# Patient Record
Sex: MALE | Race: WHITE | Employment: OTHER | URBAN - METROPOLITAN AREA
[De-identification: names, ages, dates, MRNs, and addresses within clinical notes are randomized per-mention and may not be internally consistent; named-entity substitution may affect disease eponyms.]

---

## 2023-03-06 PROBLEM — N63.0 BREAST LUMP: Status: ACTIVE | Noted: 2023-03-06

## 2023-03-06 PROBLEM — R53.83 FATIGUE: Status: ACTIVE | Noted: 2023-03-06

## 2023-03-06 PROBLEM — K64.4 EXTERNAL HEMORRHOID: Status: ACTIVE | Noted: 2023-03-06

## 2023-03-06 PROBLEM — M25.561 RIGHT KNEE PAIN: Status: ACTIVE | Noted: 2023-03-06

## 2023-03-06 PROBLEM — K92.1 BLOOD IN STOOL: Status: ACTIVE | Noted: 2023-03-06

## 2023-03-06 PROBLEM — M06.00 SERONEGATIVE RHEUMATOID ARTHRITIS (MULTI): Status: ACTIVE | Noted: 2023-03-06

## 2023-03-06 PROBLEM — R19.7 DIARRHEA: Status: ACTIVE | Noted: 2023-03-06

## 2023-03-06 PROBLEM — R07.9 CHEST PAIN: Status: ACTIVE | Noted: 2023-03-06

## 2023-03-06 PROBLEM — R35.0 URINARY FREQUENCY: Status: ACTIVE | Noted: 2023-03-06

## 2023-03-06 PROBLEM — E53.8 VITAMIN B12 DEFICIENCY: Status: ACTIVE | Noted: 2023-03-06

## 2023-03-06 PROBLEM — R79.89 ELEVATED TSH: Status: ACTIVE | Noted: 2023-03-06

## 2023-03-06 PROBLEM — H91.90 HEARING LOSS: Status: ACTIVE | Noted: 2023-03-06

## 2023-03-06 PROBLEM — I10 HYPERTENSION: Status: ACTIVE | Noted: 2023-03-06

## 2023-03-06 PROBLEM — F41.9 ANXIETY: Status: ACTIVE | Noted: 2023-03-06

## 2023-03-06 PROBLEM — H61.22 LEFT EAR IMPACTED CERUMEN: Status: ACTIVE | Noted: 2023-03-06

## 2023-03-06 PROBLEM — K90.89 BILE SALT-INDUCED DIARRHEA (HHS-HCC): Status: ACTIVE | Noted: 2023-03-06

## 2023-03-06 PROBLEM — T16.1XXA EAR FOREIGN BODY, RIGHT, INITIAL ENCOUNTER: Status: ACTIVE | Noted: 2023-03-06

## 2023-03-06 PROBLEM — M54.16 LUMBAR RADICULOPATHY: Status: ACTIVE | Noted: 2023-03-06

## 2023-03-06 PROBLEM — N62 GYNECOMASTIA: Status: ACTIVE | Noted: 2023-03-06

## 2023-03-06 PROBLEM — R20.0 FACIAL NUMBNESS: Status: ACTIVE | Noted: 2023-03-06

## 2023-03-06 PROBLEM — S40.029A HEMATOMA OF ARM: Status: ACTIVE | Noted: 2023-03-06

## 2023-03-06 PROBLEM — M54.50 LOW BACK PAIN: Status: ACTIVE | Noted: 2023-03-06

## 2023-03-06 PROBLEM — C61 PROSTATE CANCER (MULTI): Status: ACTIVE | Noted: 2023-03-06

## 2023-03-06 PROBLEM — H61.23 BILATERAL IMPACTED CERUMEN: Status: ACTIVE | Noted: 2023-03-06

## 2023-03-06 RX ORDER — METOPROLOL SUCCINATE 100 MG/1
1 TABLET, EXTENDED RELEASE ORAL DAILY
COMMUNITY
End: 2023-12-21 | Stop reason: SDUPTHER

## 2023-03-06 RX ORDER — EZETIMIBE 10 MG/1
1 TABLET ORAL DAILY
COMMUNITY

## 2023-03-06 RX ORDER — SYRINGE, DISPOSABLE, 1 ML
SYRINGE, EMPTY DISPOSABLE MISCELLANEOUS
COMMUNITY
End: 2023-03-21

## 2023-03-06 RX ORDER — LOSARTAN POTASSIUM 25 MG/1
4 TABLET ORAL DAILY
COMMUNITY

## 2023-03-06 RX ORDER — CYANOCOBALAMIN 1000 UG/ML
0.5 INJECTION, SOLUTION INTRAMUSCULAR; SUBCUTANEOUS
COMMUNITY
End: 2023-03-21

## 2023-03-06 RX ORDER — CHLORTHALIDONE 25 MG/1
0.5 TABLET ORAL DAILY
COMMUNITY
End: 2023-08-08 | Stop reason: ALTCHOICE

## 2023-03-06 RX ORDER — ALPRAZOLAM 0.25 MG/1
0.25 TABLET, ORALLY DISINTEGRATING ORAL DAILY PRN
COMMUNITY
End: 2023-03-21 | Stop reason: SDUPTHER

## 2023-03-21 ENCOUNTER — OFFICE VISIT (OUTPATIENT)
Dept: PRIMARY CARE | Facility: CLINIC | Age: 83
End: 2023-03-21
Payer: MEDICARE

## 2023-03-21 VITALS
HEIGHT: 65 IN | WEIGHT: 154.8 LBS | SYSTOLIC BLOOD PRESSURE: 138 MMHG | HEART RATE: 78 BPM | BODY MASS INDEX: 25.79 KG/M2 | DIASTOLIC BLOOD PRESSURE: 80 MMHG

## 2023-03-21 DIAGNOSIS — C61 PROSTATE CANCER (MULTI): ICD-10-CM

## 2023-03-21 DIAGNOSIS — I10 PRIMARY HYPERTENSION: ICD-10-CM

## 2023-03-21 DIAGNOSIS — Z79.899 ENCOUNTER FOR LONG-TERM (CURRENT) USE OF MEDICATIONS: ICD-10-CM

## 2023-03-21 DIAGNOSIS — F41.9 ANXIETY: Primary | ICD-10-CM

## 2023-03-21 DIAGNOSIS — M06.00 SERONEGATIVE RHEUMATOID ARTHRITIS (MULTI): ICD-10-CM

## 2023-03-21 DIAGNOSIS — Z12.11 SCREENING FOR COLON CANCER: ICD-10-CM

## 2023-03-21 PROBLEM — R79.89 ELEVATED TSH: Status: RESOLVED | Noted: 2023-03-06 | Resolved: 2023-03-21

## 2023-03-21 PROBLEM — M54.16 LUMBAR RADICULOPATHY: Status: RESOLVED | Noted: 2023-03-06 | Resolved: 2023-03-21

## 2023-03-21 PROBLEM — R20.0 FACIAL NUMBNESS: Status: RESOLVED | Noted: 2023-03-06 | Resolved: 2023-03-21

## 2023-03-21 PROBLEM — K92.1 BLOOD IN STOOL: Status: RESOLVED | Noted: 2023-03-06 | Resolved: 2023-03-21

## 2023-03-21 PROBLEM — T16.1XXA EAR FOREIGN BODY, RIGHT, INITIAL ENCOUNTER: Status: RESOLVED | Noted: 2023-03-06 | Resolved: 2023-03-21

## 2023-03-21 PROBLEM — R53.83 FATIGUE: Status: RESOLVED | Noted: 2023-03-06 | Resolved: 2023-03-21

## 2023-03-21 PROBLEM — M54.50 LOW BACK PAIN: Status: RESOLVED | Noted: 2023-03-06 | Resolved: 2023-03-21

## 2023-03-21 PROBLEM — H61.22 LEFT EAR IMPACTED CERUMEN: Status: RESOLVED | Noted: 2023-03-06 | Resolved: 2023-03-21

## 2023-03-21 PROBLEM — R07.9 CHEST PAIN: Status: RESOLVED | Noted: 2023-03-06 | Resolved: 2023-03-21

## 2023-03-21 PROBLEM — S40.029A HEMATOMA OF ARM: Status: RESOLVED | Noted: 2023-03-06 | Resolved: 2023-03-21

## 2023-03-21 PROBLEM — N63.0 BREAST LUMP: Status: RESOLVED | Noted: 2023-03-06 | Resolved: 2023-03-21

## 2023-03-21 PROBLEM — N62 GYNECOMASTIA: Status: RESOLVED | Noted: 2023-03-06 | Resolved: 2023-03-21

## 2023-03-21 PROCEDURE — 99214 OFFICE O/P EST MOD 30 MIN: CPT | Performed by: FAMILY MEDICINE

## 2023-03-21 PROCEDURE — 1159F MED LIST DOCD IN RCRD: CPT | Performed by: FAMILY MEDICINE

## 2023-03-21 PROCEDURE — 1036F TOBACCO NON-USER: CPT | Performed by: FAMILY MEDICINE

## 2023-03-21 PROCEDURE — 1160F RVW MEDS BY RX/DR IN RCRD: CPT | Performed by: FAMILY MEDICINE

## 2023-03-21 PROCEDURE — 3079F DIAST BP 80-89 MM HG: CPT | Performed by: FAMILY MEDICINE

## 2023-03-21 PROCEDURE — 3075F SYST BP GE 130 - 139MM HG: CPT | Performed by: FAMILY MEDICINE

## 2023-03-21 RX ORDER — ALPRAZOLAM 0.25 MG/1
0.25 TABLET, ORALLY DISINTEGRATING ORAL DAILY PRN
Qty: 90 TABLET | Refills: 0 | Status: SHIPPED | OUTPATIENT
Start: 2023-03-21 | End: 2023-06-07 | Stop reason: SDUPTHER

## 2023-03-21 ASSESSMENT — PATIENT HEALTH QUESTIONNAIRE - PHQ9
1. LITTLE INTEREST OR PLEASURE IN DOING THINGS: NOT AT ALL
SUM OF ALL RESPONSES TO PHQ9 QUESTIONS 1 AND 2: 0
2. FEELING DOWN, DEPRESSED OR HOPELESS: NOT AT ALL

## 2023-03-21 ASSESSMENT — ENCOUNTER SYMPTOMS
ACTIVITY CHANGE: 0
HEMATURIA: 0
NERVOUS/ANXIOUS: 1
DYSURIA: 0
FLANK PAIN: 0
CHEST TIGHTNESS: 0
APPETITE CHANGE: 0
FREQUENCY: 1

## 2023-03-21 ASSESSMENT — ANXIETY QUESTIONNAIRES
IF YOU CHECKED OFF ANY PROBLEMS ON THIS QUESTIONNAIRE, HOW DIFFICULT HAVE THESE PROBLEMS MADE IT FOR YOU TO DO YOUR WORK, TAKE CARE OF THINGS AT HOME, OR GET ALONG WITH OTHER PEOPLE: NOT DIFFICULT AT ALL
GAD7 TOTAL SCORE: 2
3. WORRYING TOO MUCH ABOUT DIFFERENT THINGS: NOT AT ALL
6. BECOMING EASILY ANNOYED OR IRRITABLE: NOT AT ALL
5. BEING SO RESTLESS THAT IT IS HARD TO SIT STILL: NOT AT ALL
2. NOT BEING ABLE TO STOP OR CONTROL WORRYING: SEVERAL DAYS
1. FEELING NERVOUS, ANXIOUS, OR ON EDGE: SEVERAL DAYS
7. FEELING AFRAID AS IF SOMETHING AWFUL MIGHT HAPPEN: NOT AT ALL
4. TROUBLE RELAXING: NOT AT ALL

## 2023-03-21 NOTE — PROGRESS NOTES
"Subjective   Patient ID: Avtar Gonsales is a 82 y.o. male who presents for Med Refill.    Med Refill  Pertinent negatives include no chest pain or congestion.      Patient reports that he has been going to see Dr. Vu as an outpatient a few years ago who had prescribed him benzodiazapine for sleep. He reports that his wife, who had a rare form of brain cancer, had passed away. He has been having more difficulty with sleep and has been taking this medication more regularly. Dr. Vu has since retired.     He has had increased difficulty with urinary incontinence.     Review of Systems   Constitutional:  Negative for activity change and appetite change.   HENT:  Negative for congestion.    Respiratory:  Negative for chest tightness.    Cardiovascular:  Negative for chest pain.   Genitourinary:  Positive for frequency. Negative for dysuria, flank pain and hematuria.   Psychiatric/Behavioral:  The patient is nervous/anxious.        Objective   /80 (BP Location: Right arm)   Pulse 78   Ht 1.651 m (5' 5\")   Wt 70.2 kg (154 lb 12.8 oz)   BMI 25.76 kg/m²   BSA Body surface area is 1.79 meters squared.      Physical Exam  Constitutional:       General: He is not in acute distress.     Appearance: Normal appearance. He is normal weight. He is not ill-appearing or toxic-appearing.   HENT:      Head: Normocephalic.   Cardiovascular:      Rate and Rhythm: Normal rate and regular rhythm.   Pulmonary:      Effort: Pulmonary effort is normal.      Breath sounds: Normal breath sounds.   Musculoskeletal:         General: Normal range of motion.   Skin:     General: Skin is warm and dry.   Neurological:      Mental Status: He is alert.   Psychiatric:         Mood and Affect: Mood normal.         Behavior: Behavior normal.         Thought Content: Thought content normal.         Judgment: Judgment normal.       No visits with results within 1 Year(s) from this visit.   Latest known visit with results is:   Legacy Encounter " "on 05/17/2021   Component Date Value Ref Range Status    Pancreatic Elastase, Fecal 05/17/2021 289  >=100 ug/g Final    Comment: REFERENCE INTERVAL: Pancreatic Elastase Fecal by                       Immunoassay    Less than 100 ug/g............Severe insufficiency    100 - 199 ug/g................Moderate insufficiency    200 ug/g or greater...........Normal  INTERPRETIVE INFORMATION: Pancreatic Elastase                             Fecal by Immunoassay  Reference intervals do not apply for infants less than one month   old.  Performed by [facility],                              [address] [place] [id number] [phone]                    [email or web address] Nova MD - Lab. Director       Current Outpatient Medications on File Prior to Visit   Medication Sig Dispense Refill    ALPRAZolam (Niravam) 0.25 mg disintegrating tablet Take 1 tablet (0.25 mg) by mouth once daily as needed.      chlorthalidone (Hygroton) 25 mg tablet Take 0.5 tablets (12.5 mg) by mouth once daily.      ezetimibe (Zetia) 10 mg tablet Take 1 tablet (10 mg) by mouth once daily.      losartan (Cozaar) 25 mg tablet Take 4 tablets (100 mg) by mouth once daily.      metoprolol succinate XL (Toprol-XL) 100 mg 24 hr tablet Take 1 tablet (100 mg) by mouth once daily.      [DISCONTINUED] cyanocobalamin (Vitamin B-12) 1,000 mcg/mL injection 0.5 mL (500 mcg) every 30 (thirty) days.      [DISCONTINUED] syringe with needle 1 mL 25 gauge x 5/8\" syringe every 30 (thirty) days. 1 syringe w/ vit b12 injections      [DISCONTINUED] syringe, disposable, (BD Luer-Santana Syringe) 1 mL syringe 25G x 5/8\" : use 1 Syring Q month with b12 injections       No current facility-administered medications on file prior to visit.     No images are attached to the encounter.      OARRS:  No data recorded  I have personally reviewed the OARRS report for Avtar Gonsales. I have considered the risks of abuse, dependence, addiction and diversion    Is the patient " prescribed a combination of a benzodiazepine and opioid?  No    Last Urine Drug Screen / ordered today: Yes  Recent Results (from the past 41695 hour(s))   OPIATE/OPIOID/BENZO PRESCRIPTION COMPLIANCE    Collection Time: 04/26/21  3:15 PM   Result Value Ref Range    DRUG SCREEN COMMENT URINE SEE BELOW     Creatine, Urine 95.0 mg/dL    Amphetamine Screen, Urine PRESUMPTIVE NEGATIVE NEGATIVE    Barbiturate Screen, Urine PRESUMPTIVE NEGATIVE NEGATIVE    Cannabinoid Screen, Urine PRESUMPTIVE NEGATIVE NEGATIVE    Cocaine Screen, Urine PRESUMPTIVE NEGATIVE NEGATIVE    PCP Screen, Urine PRESUMPTIVE NEGATIVE NEGATIVE    7-Aminoclonazepam <25 Cutoff <25 ng/mL    Alpha-Hydroxyalprazolam 41 (A) Cutoff <25 ng/mL    Alpha-Hydroxymidazolam <25 Cutoff <25 ng/mL    Alprazolam 27 (A) Cutoff <25 ng/mL    Chlordiazepoxide <25 Cutoff <25 ng/mL    Clonazepam <25 Cutoff <25 ng/mL    Diazepam <25 Cutoff <25 ng/mL    Lorazepam <25 Cutoff <25 ng/mL    Midazolam <25 Cutoff <25 ng/mL    Nordiazepam <25 Cutoff <25 ng/mL    Oxazepam <25 Cutoff <25 ng/mL    Temazepam <25 Cutoff <25 ng/mL    Zolpidem <25 Cutoff <25 ng/mL    Zolpidem Metabolite (ZCA) <25 Cutoff <25 ng/mL    6-Acetylmorphine <25 Cutoff <25 ng/mL    Codeine <50 Cutoff <50 ng/mL    Hydrocodone <25 Cutoff <25 ng/mL    Hydromorphone <25 Cutoff <25 ng/mL    Morphine Urine <50 Cutoff <50 ng/mL    Norhydrocodone <25 Cutoff <25 ng/mL    Noroxycodone <25 Cutoff <25 ng/mL    Oxycodone <25 Cutoff <25 ng/mL    Oxymorphone <25 Cutoff <25 ng/mL    Tramadol <50 Cutoff <50 ng/mL    O-Desmethyltramadol <50 Cutoff <50 ng/mL    Fentanyl <2.5 Cutoff<2.5 ng/mL    Norfentanyl <2.5 Cutoff<2.5 ng/mL    METHADONE CONFIRMATION,URINE <25 Cutoff <25 ng/mL    EDDP <25 Cutoff <25 ng/mL     Results are as expected.     Controlled Substance Agreement:  Date of the Last Agreement: 12/28/2022  Reviewed Controlled Substance Agreement including but not limited to the benefits, risks, and alternatives to treatment with  a Controlled Substance medication(s).    Benzodiazepines:  What is the patient's goal of therapy? Less anxiety  Is this being achieved with current treatment? yes    MAXIM-7:  No data recorded    Activities of Daily Living:   Is your overall impression that this patient is benefiting (symptom reduction outweighs side effects) from benzodiazepine therapy? Yes     1. Physical Functioning: Same  2. Family Relationship: Same  3. Social Relationship: Same  4. Mood: Same  5. Sleep Patterns: Same  6. Overall Function: Same      Assessment/Plan   Problem List Items Addressed This Visit          Circulatory    Primary hypertension       Genitourinary    Prostate cancer (CMS/HCC)     2006 then had prostatectomy 2013, currently stable            Other    Anxiety - Primary    Seronegative rheumatoid arthritis (CMS/[facility])     Diagnosed [facility] rheumatology, currently stable

## 2023-03-21 NOTE — PATIENT INSTRUCTIONS
1.  Patient to come in every 90 to 180 days to have his medication renewed  2.  Patient will have urine drug screen performed 3/21/2023  3.  Patient did sign his contract December 2022  4.  Patient to call for questions or concerns

## 2023-03-22 ENCOUNTER — APPOINTMENT (OUTPATIENT)
Dept: PRIMARY CARE | Facility: CLINIC | Age: 83
End: 2023-03-22
Payer: MEDICARE

## 2023-04-14 LAB — NONINV COLON CA DNA+OCC BLD SCRN STL QL: NEGATIVE

## 2023-05-08 ENCOUNTER — TELEPHONE (OUTPATIENT)
Dept: PRIMARY CARE | Facility: CLINIC | Age: 83
End: 2023-05-08
Payer: MEDICARE

## 2023-05-08 NOTE — TELEPHONE ENCOUNTER
Patient left a voicemail stating he is going to get a urine drug screen done tomorrow at the lab, requesting an urinalysis, has been having a lot of frequency. Okay to order ?

## 2023-05-09 ENCOUNTER — TELEPHONE (OUTPATIENT)
Dept: PRIMARY CARE | Facility: CLINIC | Age: 83
End: 2023-05-09
Payer: MEDICARE

## 2023-05-09 DIAGNOSIS — R30.0 DYSURIA: ICD-10-CM

## 2023-05-12 ENCOUNTER — TELEPHONE (OUTPATIENT)
Dept: PRIMARY CARE | Facility: CLINIC | Age: 83
End: 2023-05-12
Payer: MEDICARE

## 2023-05-22 ENCOUNTER — OFFICE VISIT (OUTPATIENT)
Dept: PRIMARY CARE | Facility: CLINIC | Age: 83
End: 2023-05-22
Payer: MEDICARE

## 2023-05-22 VITALS
TEMPERATURE: 98 F | OXYGEN SATURATION: 98 % | DIASTOLIC BLOOD PRESSURE: 91 MMHG | SYSTOLIC BLOOD PRESSURE: 158 MMHG | BODY MASS INDEX: 25.54 KG/M2 | WEIGHT: 153.5 LBS | HEART RATE: 73 BPM

## 2023-05-22 DIAGNOSIS — J02.9 PHARYNGITIS, UNSPECIFIED ETIOLOGY: ICD-10-CM

## 2023-05-22 LAB — POC RAPID STREP: NEGATIVE

## 2023-05-22 PROCEDURE — 87635 SARS-COV-2 COVID-19 AMP PRB: CPT

## 2023-05-22 PROCEDURE — 3077F SYST BP >= 140 MM HG: CPT | Performed by: NURSE PRACTITIONER

## 2023-05-22 PROCEDURE — 99213 OFFICE O/P EST LOW 20 MIN: CPT | Performed by: NURSE PRACTITIONER

## 2023-05-22 PROCEDURE — 1159F MED LIST DOCD IN RCRD: CPT | Performed by: NURSE PRACTITIONER

## 2023-05-22 PROCEDURE — 1036F TOBACCO NON-USER: CPT | Performed by: NURSE PRACTITIONER

## 2023-05-22 PROCEDURE — 3080F DIAST BP >= 90 MM HG: CPT | Performed by: NURSE PRACTITIONER

## 2023-05-22 PROCEDURE — 87880 STREP A ASSAY W/OPTIC: CPT | Performed by: NURSE PRACTITIONER

## 2023-05-22 PROCEDURE — 1160F RVW MEDS BY RX/DR IN RCRD: CPT | Performed by: NURSE PRACTITIONER

## 2023-05-22 RX ORDER — MIRABEGRON 50 MG/1
50 TABLET, EXTENDED RELEASE ORAL
COMMUNITY
Start: 2023-04-27 | End: 2023-08-08 | Stop reason: ALTCHOICE

## 2023-05-22 ASSESSMENT — ENCOUNTER SYMPTOMS
ABDOMINAL PAIN: 0
SHORTNESS OF BREATH: 0
DIARRHEA: 0
SINUS PRESSURE: 0
RHINORRHEA: 0
FEVER: 0
SORE THROAT: 1
FREQUENCY: 1
COUGH: 0
PALPITATIONS: 0
VOMITING: 0
SINUS PAIN: 0
WHEEZING: 0
FACIAL SWELLING: 0
FATIGUE: 0
NAUSEA: 0
CHILLS: 0

## 2023-05-22 NOTE — PATIENT INSTRUCTIONS
Recommend you continue to hold Myrbetriq until your symptoms resolve  Follow up with urology as recommended   Your in office strep was negative   We will call you with your COVID PCR results  Recommend pushing fluids  May use tyelnol for pain   Recommend you call us if your symptoms worsen worsen or do not improve

## 2023-05-22 NOTE — ASSESSMENT & PLAN NOTE
- IO strep negative  - PCR COVID test ordered  - advised patient to continue to hold Myrbetriq until his symptoms resolve and may trial again as his current symptoms may be viral  - patient plans to also reach out to Dr. Patiño

## 2023-05-22 NOTE — PROGRESS NOTES
Subjective   Chief Complaint: Overactive bladder (3 months. Side effects of Myrbetriq).    HPI   Avtar Gonsales is a 82 y.o. male who presents for Overactive bladder (3 months. Side effects of Myrbetriq).    Patient presents with sore throat for the past 3 days. He follows with urologist Dr. Patiño for overactive bladder. Patient was started on Myrbetriq 15 days ago.   Patient thought that maybe the Myrbetriq was causing the sore throat so he stopped taking it. His last dose was yesterday.     Patient also reports being around many people recently for his sons graduation party and had exposure to COVID.   He took a home COVID test yesterday which was negative.     Patient denies fever, chills, nausea, vomiting, diarrhea, chest pain, heart palpations, or shortness of breath.     Review of Systems   Constitutional:  Negative for chills, fatigue and fever.   HENT:  Positive for sore throat. Negative for congestion, ear discharge, ear pain, facial swelling, rhinorrhea, sinus pressure, sinus pain and tinnitus.    Respiratory:  Negative for cough, shortness of breath and wheezing.    Cardiovascular:  Negative for chest pain and palpitations.   Gastrointestinal:  Negative for abdominal pain, diarrhea, nausea and vomiting.   Genitourinary:  Positive for frequency. Negative for urgency.       Objective   BP (!) 158/91   Pulse 73   Temp 36.7 °C (98 °F) (Temporal)   Wt 69.6 kg (153 lb 8 oz)   SpO2 98%   BMI 25.54 kg/m²   BSA Body surface area is 1.79 meters squared.      Physical Exam  Constitutional:       Appearance: Normal appearance.   HENT:      Right Ear: Tympanic membrane normal.      Left Ear: Tympanic membrane normal.      Nose: No congestion or rhinorrhea.      Mouth/Throat:      Pharynx: Posterior oropharyngeal erythema present. No oropharyngeal exudate.   Eyes:      General:         Right eye: No discharge.   Cardiovascular:      Rate and Rhythm: Normal rate and regular rhythm.   Pulmonary:      Effort:  Pulmonary effort is normal.      Breath sounds: Normal breath sounds.   Abdominal:      General: Abdomen is flat.      Palpations: Abdomen is soft.   Neurological:      Mental Status: He is alert.       Office Visit on 03/21/2023   Component Date Value Ref Range Status    NONINV COLON CA DNA+OCC BLD SCRN S* 04/07/2023 Negative  Negative Final    Comment:   NEGATIVE TEST RESULT. A negative Cologuard result indicates a low likelihood that a colorectal cancer (CRC) or advanced adenoma (adenomatous polyps with more advanced pre-malignant features)  is present. The chance that a person with a negative Cologuard test has a colorectal cancer is less than 1 in 1500 (negative predictive value >99.9%) or has an  advanced adenoma is less than  5.3% (negative predictive value 94.7%). These data are based on a prospective cross-sectional study of 10,000 individuals at average risk for colorectal cancer who were screened with both Cologuard and colonoscopy. (Nichole BOWERS. et al, N Engl J Med 2014;370(14):9916-4770) The normal value (reference range) for this assay is negative.    COLOGUARD RE-SCREENING RECOMMENDATION: Periodic colorectal cancer screening is an important part of preventive healthcare for asymptomatic individuals at average risk for colorectal cancer.  Following a negative Cologuard result, the American Cancer Society and U.S.                            Multi-Society Task Force screening guidelines recommend a Cologuard re-screening interval of 3 years.   References: American Cancer Society Guideline for Colorectal Cancer Screening: https://www.cancer.org/cancer/colon-rectal-cancer/mtprmppoo-rninrxlwz-fvdpmgh/acs-recommendations.html.; Jameel JOENS, Ailyn MARION, Ignacia WHALENK, Colorectal Cancer Screening: Recommendations for Physicians and Patients from the U.S. Multi-Society Task Force on Colorectal Cancer Screening , Am J Gastroenterology 2017; 112:5301-5679.    TEST DESCRIPTION: Composite algorithmic analysis of stool  DNA-biomarkers with hemoglobin immunoassay.   Quantitative values of individual biomarkers are not reportable and are not associated with individual biomarker result reference ranges. Cologuard is intended for colorectal cancer screening of adults of either sex, 45 years or older, who are at average-risk for colorectal cancer (CRC). Cologuard has been approved for use by the U.S. FDA. The performance of Cologuard was                            established in a cross sectional study of average-risk adults aged 50-84. Cologuard performance in patients ages 45 to 49 years was estimated by sub-group analysis of near-age groups. Colonoscopies performed for a positive result may find as the most clinically significant lesion: colorectal cancer [4.0%], advanced adenoma (including sessile serrated polyps greater than or equal to 1cm diameter) [20%] or non- advanced adenoma [31%]; or no colorectal neoplasia [45%]. These estimates are derived from a prospective cross-sectional screening study of 10,000 individuals at average risk for colorectal cancer who were screened with both Cologuard and colonoscopy. (Nichole AARON et al, N Engl J Med 2014;370(14):8310-7948.) Cologuard may produce a false negative or false positive result (no colorectal cancer or precancerous polyp present at colonoscopy follow up). A negative Cologuard test result does not guarantee the absence of CRC or advanced adenoma (pre-cancer). The current Cologuard                            screening interval is every 3 years. (American Cancer Society and U.S. Multi-Society Task Force). Cologuard performance data in a 10,000 patient pivotal study using colonoscopy as the reference method can be accessed at the following location: www.[employer].DocASAP/results. Additional description of the Cologuard test process, warnings and precautions can be found at [email or web address].       Current Outpatient Medications on File Prior to Visit   Medication Sig Dispense Refill     ALPRAZolam (Niravam) 0.25 mg disintegrating tablet Take 1 tablet (0.25 mg) by mouth once daily as needed for anxiety. 90 tablet 0    ezetimibe (Zetia) 10 mg tablet Take 1 tablet (10 mg) by mouth once daily.      losartan (Cozaar) 25 mg tablet Take 4 tablets (100 mg) by mouth once daily.      metoprolol succinate XL (Toprol-XL) 100 mg 24 hr tablet Take 1 tablet (100 mg) by mouth once daily.      mirabegron (Myrbetriq) 50 mg tablet extended release 24 hr 24 hr tablet Take 1 tablet (50 mg) by mouth once daily.      chlorthalidone (Hygroton) 25 mg tablet Take 0.5 tablets (12.5 mg) by mouth once daily.       No current facility-administered medications on file prior to visit.     No images are attached to the encounter.            Assessment/Plan   Problem List Items Addressed This Visit          Infectious/Inflammatory    Pharyngitis     - IO strep negative  - PCR COVID test ordered  - advised patient to continue to hold Myrbetriq until his symptoms resolve and may trial again as his current symptoms may be viral  - patient plans to also reach out to Dr. Patiño            Relevant Orders    POCT Rapid Strep A manually resulted (Completed)    Sars-CoV-2 PCR, Symptomatic

## 2023-05-23 ENCOUNTER — APPOINTMENT (OUTPATIENT)
Dept: PRIMARY CARE | Facility: CLINIC | Age: 83
End: 2023-05-23
Payer: MEDICARE

## 2023-05-23 PROCEDURE — 87635 SARS-COV-2 COVID-19 AMP PRB: CPT

## 2023-05-24 LAB
SARS-COV-2 RESULT: NOT DETECTED
SARS-COV-2 RESULT: NOT DETECTED

## 2023-06-06 ASSESSMENT — ENCOUNTER SYMPTOMS
APPETITE CHANGE: 0
FLANK PAIN: 0
CHEST TIGHTNESS: 0
DYSURIA: 0
NERVOUS/ANXIOUS: 1
ACTIVITY CHANGE: 0
FREQUENCY: 1
HEMATURIA: 0

## 2023-06-06 NOTE — PROGRESS NOTES
Subjective   Patient ID: Avtar Gonsales is a 82 y.o. male who presents for No chief complaint on file..    Med Refill  Pertinent negatives include no chest pain or congestion.      Patient reports that he has been going to see Dr. Vu as an outpatient a few years ago who had prescribed him benzodiazapine for sleep. He reports that his wife, who had a rare form of brain cancer, had passed away. He has been having more difficulty with sleep and has been taking this medication more regularly. Dr. Vu has since retired.     He has had increased difficulty with urinary incontinence.     Review of Systems   Constitutional:  Negative for activity change and appetite change.   HENT:  Negative for congestion.    Respiratory:  Negative for chest tightness.    Cardiovascular:  Negative for chest pain.   Genitourinary:  Positive for frequency. Negative for dysuria, flank pain and hematuria.   Psychiatric/Behavioral:  The patient is nervous/anxious.        Objective   There were no vitals taken for this visit.  BSA There is no height or weight on file to calculate BSA.      Physical Exam  Constitutional:       General: He is not in acute distress.     Appearance: Normal appearance. He is normal weight. He is not ill-appearing or toxic-appearing.   HENT:      Head: Normocephalic.   Cardiovascular:      Rate and Rhythm: Normal rate and regular rhythm.   Pulmonary:      Effort: Pulmonary effort is normal.      Breath sounds: Normal breath sounds.   Musculoskeletal:         General: Normal range of motion.   Skin:     General: Skin is warm and dry.   Neurological:      Mental Status: He is alert.   Psychiatric:         Mood and Affect: Mood normal.         Behavior: Behavior normal.         Thought Content: Thought content normal.         Judgment: Judgment normal.       Office Visit on 05/22/2023   Component Date Value Ref Range Status    POC Rapid Strep 05/22/2023 Negative  Negative Final    SARS-COV-2 RESULT 05/22/2023 NOT  DETECTED  Not Detected Final    Comment: .  This assay is designed to detect the ORF1a/b and E genes of SARS-CoV-2 via   nucleic acid amplification. A Not Detected result does not preclude   2019-nCoV infection since the adequacy of sample collection and/or low viral   burden may result in presence of viral nucleic acids below the clinical   sensitivity of this test method.     Fact sheet for providers: https://www.fda.gov/media/865384/download   Fact sheet for patients: https://www.fda.gov/media/862120/download     This test has received FDA Emergency Use Authorization (EUA) and has been   verified for use by [facility] ([facility]).   This test is only authorized for the duration of time that circumstances   exist to justify the authorization of the emergency use of in vitro   diagnostic tests for the detection of SARS-CoV-2 virus and/or diagnosis of   COVID-19 infection under section 564(b)(1) of the Act, 21 U.S.C.   360bbb-3(b)(1), unless the authorization is terminated or revoked                            sooner.    [facility] is certified under CLIA-88 as   qualified to perform high complexity testing. Testing is performed in the   [facility] laboratories located at [address] [place] [address].    SARS-COV-2 RESULT 05/23/2023 NOT DETECTED  Not Detected Final    Comment: .  This assay is designed to detect the ORF1a/b and E genes of SARS-CoV-2 via   nucleic acid amplification. A Not Detected result does not preclude   2019-nCoV infection since the adequacy of sample collection and/or low viral   burden may result in presence of viral nucleic acids below the clinical   sensitivity of this test method.     Fact sheet for providers: https://www.fda.gov/media/852418/download   Fact sheet for patients: https://www.fda.gov/media/039961/download     This test has received FDA Emergency Use Authorization (EUA) and has been   verified for use by  [facility] ([facility]).   This test is only authorized for the duration of time that circumstances   exist to justify the authorization of the emergency use of in vitro   diagnostic tests for the detection of SARS-CoV-2 virus and/or diagnosis of   COVID-19 infection under section 564(b)(1) of the Act, 21 U.S.C.   360bbb-3(b)(1), unless the authorization is terminated or revoked                            sooner.    [facility] is certified under CLIA-88 as   qualified to perform high complexity testing. Testing is performed in the   [facility] laboratories located at [address] [place] [address].   Office Visit on 03/21/2023   Component Date Value Ref Range Status    NONINV COLON CA DNA+OCC BLD SCRN S* 04/07/2023 Negative  Negative Final    Comment:   NEGATIVE TEST RESULT. A negative Cologuard result indicates a low likelihood that a colorectal cancer (CRC) or advanced adenoma (adenomatous polyps with more advanced pre-malignant features)  is present. The chance that a person with a negative Cologuard test has a colorectal cancer is less than 1 in 1500 (negative predictive value >99.9%) or has an  advanced adenoma is less than  5.3% (negative predictive value 94.7%). These data are based on a prospective cross-sectional study of 10,000 individuals at average risk for colorectal cancer who were screened with both Cologuard and colonoscopy. (Nichole Singh al, N Engl J Med 2014;370(14):0778-0342) The normal value (reference range) for this assay is negative.    COLOGUARD RE-SCREENING RECOMMENDATION: Periodic colorectal cancer screening is an important part of preventive healthcare for asymptomatic individuals at average risk for colorectal cancer.  Following a negative Cologuard result, the American Cancer Society and U.S.                            Multi-Society Task Force screening guidelines recommend a Cologuard re-screening interval of 3 years.    References: American Cancer Society Guideline for Colorectal Cancer Screening: https://www.cancer.org/cancer/colon-rectal-cancer/sovluyygb-kaaaskfgr-gabysbi/acs-recommendations.html.; Jameel DK, Ailyn MARION, Ignacia WHALENK, Colorectal Cancer Screening: Recommendations for Physicians and Patients from the U.S. Multi-Society Task Force on Colorectal Cancer Screening , Am J Gastroenterology 2017; 112:5321-0306.    TEST DESCRIPTION: Composite algorithmic analysis of stool DNA-biomarkers with hemoglobin immunoassay.   Quantitative values of individual biomarkers are not reportable and are not associated with individual biomarker result reference ranges. Cologuard is intended for colorectal cancer screening of adults of either sex, 45 years or older, who are at average-risk for colorectal cancer (CRC). Cologuard has been approved for use by the U.S. FDA. The performance of Cologuard was                            established in a cross sectional study of average-risk adults aged 50-84. Cologuard performance in patients ages 45 to 49 years was estimated by sub-group analysis of near-age groups. Colonoscopies performed for a positive result may find as the most clinically significant lesion: colorectal cancer [4.0%], advanced adenoma (including sessile serrated polyps greater than or equal to 1cm diameter) [20%] or non- advanced adenoma [31%]; or no colorectal neoplasia [45%]. These estimates are derived from a prospective cross-sectional screening study of 10,000 individuals at average risk for colorectal cancer who were screened with both Cologuard and colonoscopy. (Nichole AARON et al, N Engl J Med 2014;370(14):5459-6811.) Cologuard may produce a false negative or false positive result (no colorectal cancer or precancerous polyp present at colonoscopy follow up). A negative Cologuard test result does not guarantee the absence of CRC or advanced adenoma (pre-cancer). The current Cologuard                            screening  interval is every 3 years. (American Cancer Society and U.S. Multi-Society Task Force). Cologuard performance data in a 10,000 patient pivotal study using colonoscopy as the reference method can be accessed at the following location: www.[email or web address]/results. Additional description of the Cologuard test process, warnings and precautions can be found at www.[email or web address].       Current Outpatient Medications on File Prior to Visit   Medication Sig Dispense Refill    ALPRAZolam (Niravam) 0.25 mg disintegrating tablet Take 1 tablet (0.25 mg) by mouth once daily as needed for anxiety. 90 tablet 0    chlorthalidone (Hygroton) 25 mg tablet Take 0.5 tablets (12.5 mg) by mouth once daily.      ezetimibe (Zetia) 10 mg tablet Take 1 tablet (10 mg) by mouth once daily.      losartan (Cozaar) 25 mg tablet Take 4 tablets (100 mg) by mouth once daily.      metoprolol succinate XL (Toprol-XL) 100 mg 24 hr tablet Take 1 tablet (100 mg) by mouth once daily.      mirabegron (Myrbetriq) 50 mg tablet extended release 24 hr 24 hr tablet Take 1 tablet (50 mg) by mouth once daily.       No current facility-administered medications on file prior to visit.     No images are attached to the encounter.      OARRS:  No data recorded  I have personally reviewed the OARRS report for Avtar Gonsales. I have considered the risks of abuse, dependence, addiction and diversion    Is the patient prescribed a combination of a benzodiazepine and opioid?  No    Last Urine Drug Screen / ordered today: Yes  Recent Results (from the past 62153 hour(s))   OPIATE/OPIOID/BENZO PRESCRIPTION COMPLIANCE    Collection Time: 04/26/21  3:15 PM   Result Value Ref Range    DRUG SCREEN COMMENT URINE SEE BELOW     Creatine, Urine 95.0 mg/dL    Amphetamine Screen, Urine PRESUMPTIVE NEGATIVE NEGATIVE    Barbiturate Screen, Urine PRESUMPTIVE NEGATIVE NEGATIVE    Cannabinoid Screen, Urine PRESUMPTIVE NEGATIVE NEGATIVE    Cocaine Screen, Urine PRESUMPTIVE NEGATIVE NEGATIVE     PCP Screen, Urine PRESUMPTIVE NEGATIVE NEGATIVE    7-Aminoclonazepam <25 Cutoff <25 ng/mL    Alpha-Hydroxyalprazolam 41 (A) Cutoff <25 ng/mL    Alpha-Hydroxymidazolam <25 Cutoff <25 ng/mL    Alprazolam 27 (A) Cutoff <25 ng/mL    Chlordiazepoxide <25 Cutoff <25 ng/mL    Clonazepam <25 Cutoff <25 ng/mL    Diazepam <25 Cutoff <25 ng/mL    Lorazepam <25 Cutoff <25 ng/mL    Midazolam <25 Cutoff <25 ng/mL    Nordiazepam <25 Cutoff <25 ng/mL    Oxazepam <25 Cutoff <25 ng/mL    Temazepam <25 Cutoff <25 ng/mL    Zolpidem <25 Cutoff <25 ng/mL    Zolpidem Metabolite (ZCA) <25 Cutoff <25 ng/mL    6-Acetylmorphine <25 Cutoff <25 ng/mL    Codeine <50 Cutoff <50 ng/mL    Hydrocodone <25 Cutoff <25 ng/mL    Hydromorphone <25 Cutoff <25 ng/mL    Morphine Urine <50 Cutoff <50 ng/mL    Norhydrocodone <25 Cutoff <25 ng/mL    Noroxycodone <25 Cutoff <25 ng/mL    Oxycodone <25 Cutoff <25 ng/mL    Oxymorphone <25 Cutoff <25 ng/mL    Tramadol <50 Cutoff <50 ng/mL    O-Desmethyltramadol <50 Cutoff <50 ng/mL    Fentanyl <2.5 Cutoff<2.5 ng/mL    Norfentanyl <2.5 Cutoff<2.5 ng/mL    METHADONE CONFIRMATION,URINE <25 Cutoff <25 ng/mL    EDDP <25 Cutoff <25 ng/mL     Results are as expected.     Controlled Substance Agreement:  Date of the Last Agreement: 12/28/2022  Reviewed Controlled Substance Agreement including but not limited to the benefits, risks, and alternatives to treatment with a Controlled Substance medication(s).    Benzodiazepines:  What is the patient's goal of therapy? Less anxiety  Is this being achieved with current treatment? yes    MAXIM-7:  No data recorded    Activities of Daily Living:   Is your overall impression that this patient is benefiting (symptom reduction outweighs side effects) from benzodiazepine therapy? Yes     1. Physical Functioning: Same  2. Family Relationship: Same  3. Social Relationship: Same  4. Mood: Same  5. Sleep Patterns: Same  6. Overall Function: Same      Assessment/Plan   Problem List Items  Addressed This Visit       Anxiety - Primary   1. Patient realizes he has to come in every 90 days to have this medication refilled   2. Patient did sign contract 12/28/2022  3. Patient did have UDS performed 06/06/2023  4. Patient to call if questions or concerns

## 2023-06-07 ENCOUNTER — TELEPHONE (OUTPATIENT)
Dept: PRIMARY CARE | Facility: CLINIC | Age: 83
End: 2023-06-07

## 2023-06-07 ENCOUNTER — OFFICE VISIT (OUTPATIENT)
Dept: PRIMARY CARE | Facility: CLINIC | Age: 83
End: 2023-06-07
Payer: MEDICARE

## 2023-06-07 VITALS
WEIGHT: 154.8 LBS | BODY MASS INDEX: 25.76 KG/M2 | SYSTOLIC BLOOD PRESSURE: 142 MMHG | DIASTOLIC BLOOD PRESSURE: 80 MMHG | HEART RATE: 72 BPM

## 2023-06-07 DIAGNOSIS — F41.9 ANXIETY: Primary | ICD-10-CM

## 2023-06-07 DIAGNOSIS — Z79.899 CHRONIC PRESCRIPTION BENZODIAZEPINE USE: ICD-10-CM

## 2023-06-07 PROCEDURE — 1036F TOBACCO NON-USER: CPT | Performed by: FAMILY MEDICINE

## 2023-06-07 PROCEDURE — 99214 OFFICE O/P EST MOD 30 MIN: CPT | Performed by: FAMILY MEDICINE

## 2023-06-07 PROCEDURE — 1159F MED LIST DOCD IN RCRD: CPT | Performed by: FAMILY MEDICINE

## 2023-06-07 PROCEDURE — 3079F DIAST BP 80-89 MM HG: CPT | Performed by: FAMILY MEDICINE

## 2023-06-07 PROCEDURE — 1160F RVW MEDS BY RX/DR IN RCRD: CPT | Performed by: FAMILY MEDICINE

## 2023-06-07 PROCEDURE — 3077F SYST BP >= 140 MM HG: CPT | Performed by: FAMILY MEDICINE

## 2023-06-07 RX ORDER — ALPRAZOLAM 0.25 MG/1
0.25 TABLET, ORALLY DISINTEGRATING ORAL DAILY PRN
Qty: 30 TABLET | Refills: 2 | Status: SHIPPED
Start: 2023-06-07 | End: 2023-06-07 | Stop reason: SDUPTHER

## 2023-06-07 RX ORDER — ALPRAZOLAM 0.25 MG/1
0.25 TABLET, ORALLY DISINTEGRATING ORAL DAILY PRN
Qty: 30 TABLET | Refills: 2 | Status: SHIPPED | OUTPATIENT
Start: 2023-06-07 | End: 2023-08-09 | Stop reason: ALTCHOICE

## 2023-06-07 ASSESSMENT — ANXIETY QUESTIONNAIRES
6. BECOMING EASILY ANNOYED OR IRRITABLE: NOT AT ALL
IF YOU CHECKED OFF ANY PROBLEMS ON THIS QUESTIONNAIRE, HOW DIFFICULT HAVE THESE PROBLEMS MADE IT FOR YOU TO DO YOUR WORK, TAKE CARE OF THINGS AT HOME, OR GET ALONG WITH OTHER PEOPLE: NOT DIFFICULT AT ALL
5. BEING SO RESTLESS THAT IT IS HARD TO SIT STILL: NOT AT ALL
GAD7 TOTAL SCORE: 2
1. FEELING NERVOUS, ANXIOUS, OR ON EDGE: NOT AT ALL
2. NOT BEING ABLE TO STOP OR CONTROL WORRYING: SEVERAL DAYS
7. FEELING AFRAID AS IF SOMETHING AWFUL MIGHT HAPPEN: NOT AT ALL
4. TROUBLE RELAXING: SEVERAL DAYS
3. WORRYING TOO MUCH ABOUT DIFFERENT THINGS: NOT AT ALL

## 2023-06-07 NOTE — ADDENDUM NOTE
Addended by: HOLGER BERGMAN on: 6/7/2023 01:12 PM     Modules accepted: Orders     GERD (gastroesophageal reflux disease)    Hypothyroid    IBS (irritable bowel syndrome)    Knee pain, left    Lymphocytosis  currently being worked up  Osteoarthritis    Positional vertigo    Prosthetic eye globe  left eye

## 2023-06-07 NOTE — TELEPHONE ENCOUNTER
Jacque from [facility] called requesting a referral   Jacque called again on 6/9/23 for a referral to be faxed to [phone] by Tuesday prior to his appointment.

## 2023-06-12 DIAGNOSIS — N32.81 OVERACTIVE BLADDER: ICD-10-CM

## 2023-06-14 ENCOUNTER — TELEPHONE (OUTPATIENT)
Dept: PRIMARY CARE | Facility: CLINIC | Age: 83
End: 2023-06-14
Payer: MEDICARE

## 2023-06-21 ENCOUNTER — APPOINTMENT (OUTPATIENT)
Dept: PRIMARY CARE | Facility: CLINIC | Age: 83
End: 2023-06-21
Payer: MEDICARE

## 2023-06-30 ENCOUNTER — TELEPHONE (OUTPATIENT)
Dept: PRIMARY CARE | Facility: CLINIC | Age: 83
End: 2023-06-30
Payer: MEDICARE

## 2023-06-30 DIAGNOSIS — E78.5 HYPERLIPIDEMIA, UNSPECIFIED HYPERLIPIDEMIA TYPE: ICD-10-CM

## 2023-06-30 DIAGNOSIS — C61 PROSTATE CANCER (MULTI): ICD-10-CM

## 2023-06-30 DIAGNOSIS — I10 PRIMARY HYPERTENSION: ICD-10-CM

## 2023-06-30 DIAGNOSIS — R35.0 URINARY FREQUENCY: ICD-10-CM

## 2023-06-30 NOTE — TELEPHONE ENCOUNTER
Please schedule patient for [facility] wellness visit after 7/1/2023.    Thank you-  Jona Saldana CMA  6/30/2023  Practice Supervisor  [facility]

## 2023-07-06 PROBLEM — E78.5 HYPERLIPIDEMIA: Status: ACTIVE | Noted: 2022-03-30

## 2023-08-08 ENCOUNTER — OFFICE VISIT (OUTPATIENT)
Dept: PRIMARY CARE | Facility: CLINIC | Age: 83
End: 2023-08-08
Payer: MEDICARE

## 2023-08-08 VITALS
HEART RATE: 74 BPM | OXYGEN SATURATION: 96 % | WEIGHT: 151 LBS | DIASTOLIC BLOOD PRESSURE: 78 MMHG | HEIGHT: 65 IN | SYSTOLIC BLOOD PRESSURE: 135 MMHG | BODY MASS INDEX: 25.16 KG/M2

## 2023-08-08 DIAGNOSIS — I10 PRIMARY HYPERTENSION: Primary | ICD-10-CM

## 2023-08-08 DIAGNOSIS — F41.9 ANXIETY: ICD-10-CM

## 2023-08-08 DIAGNOSIS — E87.1 HYPONATREMIA: ICD-10-CM

## 2023-08-08 PROBLEM — C61 PROSTATE CANCER (MULTI): Status: RESOLVED | Noted: 2023-03-06 | Resolved: 2023-08-08

## 2023-08-08 PROBLEM — H90.3 SENSORINEURAL HEARING LOSS, BILATERAL: Status: ACTIVE | Noted: 2019-05-14

## 2023-08-08 PROBLEM — H35.3132 NONEXUDATIVE AGE-RELATED MACULAR DEGENERATION, BILATERAL, INTERMEDIATE DRY STAGE: Status: ACTIVE | Noted: 2017-09-14

## 2023-08-08 PROBLEM — Z96.1 PSEUDOPHAKIA OF BOTH EYES: Status: ACTIVE | Noted: 2019-08-05

## 2023-08-08 PROBLEM — H61.23 BILATERAL IMPACTED CERUMEN: Status: RESOLVED | Noted: 2023-03-06 | Resolved: 2023-08-08

## 2023-08-08 PROBLEM — H43.393 VITREOUS FLOATER, BILATERAL: Status: ACTIVE | Noted: 2022-02-09

## 2023-08-08 PROBLEM — Z85.46 H/O PROSTATE CANCER: Status: ACTIVE | Noted: 2023-08-02

## 2023-08-08 PROCEDURE — 99214 OFFICE O/P EST MOD 30 MIN: CPT | Performed by: NURSE PRACTITIONER

## 2023-08-08 PROCEDURE — 3075F SYST BP GE 130 - 139MM HG: CPT | Performed by: NURSE PRACTITIONER

## 2023-08-08 PROCEDURE — 1036F TOBACCO NON-USER: CPT | Performed by: NURSE PRACTITIONER

## 2023-08-08 PROCEDURE — 3078F DIAST BP <80 MM HG: CPT | Performed by: NURSE PRACTITIONER

## 2023-08-08 PROCEDURE — 1160F RVW MEDS BY RX/DR IN RCRD: CPT | Performed by: NURSE PRACTITIONER

## 2023-08-08 PROCEDURE — 1126F AMNT PAIN NOTED NONE PRSNT: CPT | Performed by: NURSE PRACTITIONER

## 2023-08-08 PROCEDURE — 1159F MED LIST DOCD IN RCRD: CPT | Performed by: NURSE PRACTITIONER

## 2023-08-08 RX ORDER — AMLODIPINE BESYLATE 2.5 MG/1
2.5 TABLET ORAL
COMMUNITY
Start: 2023-07-30 | End: 2023-10-24 | Stop reason: ALTCHOICE

## 2023-08-08 RX ORDER — HYDROXYZINE HYDROCHLORIDE 25 MG/1
25 TABLET, FILM COATED ORAL 2 TIMES DAILY
Qty: 60 TABLET | Refills: 0 | Status: CANCELLED | OUTPATIENT
Start: 2023-08-08 | End: 2023-09-07

## 2023-08-08 ASSESSMENT — ENCOUNTER SYMPTOMS
COUGH: 0
CONFUSION: 0
VOMITING: 0
NAUSEA: 0
CHILLS: 0
PALPITATIONS: 0
NERVOUS/ANXIOUS: 1
SHORTNESS OF BREATH: 0
DIARRHEA: 0
ABDOMINAL PAIN: 0
FEVER: 0

## 2023-08-08 NOTE — PROGRESS NOTES
"Subjective   Chief Complaint: Hospital Follow-up and Hypertension.    HPI   Avtar Gonsales is a 82 y.o. male who presents for Hospital Follow-up and Hypertension.    Patient presents for ER visit follow up. Patient reports that he presented to ER yesterday due to elevated BP readings at home 180/110. He reports that he had been on Desmopressin for 3 days for his overactive bladder.   He follows with Cardiologist Dr. Flores who advised patient that he may start taking Amlodipine 2.5mg at noon in addition to his current medication regimen (Losartan, metoprolol),     Patient is wondering if his anxiety may be contributing to his elevated BP as well. He has been under a lot of stress regarding his overactive nightly bladder.   He is disappointed that the Desmopressin caused his BP to elevated as this medication was helping his overactive bladder.  He is considering starting Effexor to aid in his anxiety    While in the ER  it was noted that his sodium was low at 129. He was given 1/2 NS IVF and ativan in the ER and discharged home. Patient blood pressure improved. He reports he slept well after being given the ativan.     This morning felt tense and anxious and had not taken his BP medication yet. His BP was 150/90. After taking his medications and additional 2.5mg amlodipine his BP improved to 135/78 during visit.     Patient denies fever, chills, nausea, vomiting, diarrhea, chest pain, heart palpations, or shortness of breath.       Review of Systems   Constitutional:  Negative for chills and fever.   Respiratory:  Negative for cough and shortness of breath.    Cardiovascular:  Negative for chest pain and palpitations.   Gastrointestinal:  Negative for abdominal pain, diarrhea, nausea and vomiting.   Psychiatric/Behavioral:  Negative for confusion. The patient is nervous/anxious.        Objective   /78   Pulse 74   Ht 1.651 m (5' 5\")   Wt 68.5 kg (151 lb)   SpO2 96%   BMI 25.13 kg/m²   BSA Body surface area " is 1.77 meters squared.      Physical Exam  Constitutional:       Appearance: Normal appearance.   Neurological:      General: No focal deficit present.      Mental Status: He is alert.       Office Visit on 05/22/2023   Component Date Value Ref Range Status    POC Rapid Strep 05/22/2023 Negative  Negative Final    SARS-CoV-2 Result 05/22/2023 NOT DETECTED  Not Detected Final    Comment: .  This assay is designed to detect the ORF1a/b and E genes of SARS-CoV-2 via   nucleic acid amplification. A Not Detected result does not preclude   2019-nCoV infection since the adequacy of sample collection and/or low viral   burden may result in presence of viral nucleic acids below the clinical   sensitivity of this test method.     Fact sheet for providers: https://www.fda.gov/media/291724/download   Fact sheet for patients: https://www.fda.gov/media/216302/download     This test has received FDA Emergency Use Authorization (EUA) and has been   verified for use by [facility] ([facility]).   This test is only authorized for the duration of time that circumstances   exist to justify the authorization of the emergency use of in vitro   diagnostic tests for the detection of SARS-CoV-2 virus and/or diagnosis of   COVID-19 infection under section 564(b)(1) of the Act, 21 U.S.C.   360bbb-3(b)(1), unless the authorization is terminated or revoked                            sooner.    [facility] is certified under CLIA-88 as   qualified to perform high complexity testing. Testing is performed in the   [facility] laboratories located at [address] [place] [address].    SARS-CoV-2 Result 05/23/2023 NOT DETECTED  Not Detected Final    Comment: .  This assay is designed to detect the ORF1a/b and E genes of SARS-CoV-2 via   nucleic acid amplification. A Not Detected result does not preclude   2019-nCoV infection since the adequacy of sample collection and/or low viral    burden may result in presence of viral nucleic acids below the clinical   sensitivity of this test method.     Fact sheet for providers: https://www.fda.gov/media/145210/download   Fact sheet for patients: https://www.fda.gov/media/660851/download     This test has received FDA Emergency Use Authorization (EUA) and has been   verified for use by [facility] ([facility]).   This test is only authorized for the duration of time that circumstances   exist to justify the authorization of the emergency use of in vitro   diagnostic tests for the detection of SARS-CoV-2 virus and/or diagnosis of   COVID-19 infection under section 564(b)(1) of the Act, 21 U.S.C.   360bbb-3(b)(1), unless the authorization is terminated or revoked                            sooner.    [facility] is certified under CLIA-88 as   qualified to perform high complexity testing. Testing is performed in the   [facility] laboratories located at [address] [place] [address].   Office Visit on 03/21/2023   Component Date Value Ref Range Status    NONINV COLON CA DNA+OCC BLD SCRN S* 04/07/2023 Negative  Negative Final    Comment:   NEGATIVE TEST RESULT. A negative Cologuard result indicates a low likelihood that a colorectal cancer (CRC) or advanced adenoma (adenomatous polyps with more advanced pre-malignant features)  is present. The chance that a person with a negative Cologuard test has a colorectal cancer is less than 1 in 1500 (negative predictive value >99.9%) or has an  advanced adenoma is less than  5.3% (negative predictive value 94.7%). These data are based on a prospective cross-sectional study of 10,000 individuals at average risk for colorectal cancer who were screened with both Cologuard and colonoscopy. (Nichole AARON et al, N Engl J Med 2014;370(14):8141-5928) The normal value (reference range) for this assay is negative.    COLOGUARD RE-SCREENING RECOMMENDATION:  Periodic colorectal cancer screening is an important part of preventive healthcare for asymptomatic individuals at average risk for colorectal cancer.  Following a negative Cologuard result, the American Cancer Society and U.S.                            Multi-Society Task Force screening guidelines recommend a Cologuard re-screening interval of 3 years.   References: American Cancer Society Guideline for Colorectal Cancer Screening: https://www.cancer.org/cancer/colon-rectal-cancer/ymnbyfzap-wnqoetpbj-yvfkdxj/acs-recommendations.html.; Jameel DK, Ailyn MARION, Ignacia AU, Colorectal Cancer Screening: Recommendations for Physicians and Patients from the U.S. Multi-Society Task Force on Colorectal Cancer Screening , Am J Gastroenterology 2017; 112:7750-1793.    TEST DESCRIPTION: Composite algorithmic analysis of stool DNA-biomarkers with hemoglobin immunoassay.   Quantitative values of individual biomarkers are not reportable and are not associated with individual biomarker result reference ranges. Cologuard is intended for colorectal cancer screening of adults of either sex, 45 years or older, who are at average-risk for colorectal cancer (CRC). Cologuard has been approved for use by the U.S. FDA. The performance of Cologuard was                            established in a cross sectional study of average-risk adults aged 50-84. Cologuard performance in patients ages 45 to 49 years was estimated by sub-group analysis of near-age groups. Colonoscopies performed for a positive result may find as the most clinically significant lesion: colorectal cancer [4.0%], advanced adenoma (including sessile serrated polyps greater than or equal to 1cm diameter) [20%] or non- advanced adenoma [31%]; or no colorectal neoplasia [45%]. These estimates are derived from a prospective cross-sectional screening study of 10,000 individuals at average risk for colorectal cancer who were screened with both Cologuard and colonoscopy. ([facility]  AGNIESZKA et al, N Engl J Med 2014;370(14):5724-2891.) Cologuard may produce a false negative or false positive result (no colorectal cancer or precancerous polyp present at colonoscopy follow up). A negative Cologuard test result does not guarantee the absence of CRC or advanced adenoma (pre-cancer). The current Cologuard                            screening interval is every 3 years. (American Cancer Society and U.S. Multi-Society Task Force). Cologuard performance data in a 10,000 patient pivotal study using colonoscopy as the reference method can be accessed at the following location: [email or web address]/results. Additional description of the Cologuard test process, warnings and precautions can be found at www.[email or web address].       Current Outpatient Medications on File Prior to Visit   Medication Sig Dispense Refill    amLODIPine (Norvasc) 2.5 mg tablet Take 1 tablet (2.5 mg) by mouth once daily.      ezetimibe (Zetia) 10 mg tablet Take 1 tablet (10 mg) by mouth once daily.      losartan (Cozaar) 25 mg tablet Take 4 tablets (100 mg) by mouth once daily.      metoprolol succinate XL (Toprol-XL) 100 mg 24 hr tablet Take 1 tablet (100 mg) by mouth once daily.      ALPRAZolam (Niravam) 0.25 mg disintegrating tablet Take 1 tablet (0.25 mg) by mouth once daily as needed for anxiety. 30 tablet 2    [DISCONTINUED] chlorthalidone (Hygroton) 25 mg tablet Take 0.5 tablets (12.5 mg) by mouth once daily.      [DISCONTINUED] mirabegron (Myrbetriq) 50 mg tablet extended release 24 hr 24 hr tablet Take 1 tablet (50 mg) by mouth once daily.       No current facility-administered medications on file prior to visit.     No images are attached to the encounter.            Assessment/Plan   Problem List Items Addressed This Visit       Primary hypertension - Primary     Continue Losartan 100mg daily, Metoprolol 100mg daily, and Amlodipine 2.5mg  Recommend added another Amlodipine 2.5mg at noon if needed for elevated blood pressure            Hyponatremia     Likely related to Desmopressin patient was on -- since discontinued  BMP ordered            Relevant Orders    Basic metabolic panel    Anxiety     Follow up with PCP tomorrow to discuss options for anxiety  Patient reports he may be interested in switching xanax to Ativan and start Effexor

## 2023-08-08 NOTE — ASSESSMENT & PLAN NOTE
Continue Losartan 100mg daily, Metoprolol 100mg daily, and Amlodipine 2.5mg  Recommend added another Amlodipine 2.5mg at noon if needed for elevated blood pressure

## 2023-08-08 NOTE — PATIENT INSTRUCTIONS
Continue medication as prescribed  May take additional 2.5mg amlodipine if blood pressure is elevated.   Follow up with Dr. Sorto for anxiety tomorrow

## 2023-08-08 NOTE — ASSESSMENT & PLAN NOTE
Follow up with PCP tomorrow to discuss options for anxiety  Patient reports he may be interested in switching xanax to Ativan and start Effexor

## 2023-08-09 ENCOUNTER — OFFICE VISIT (OUTPATIENT)
Dept: PRIMARY CARE | Facility: CLINIC | Age: 83
End: 2023-08-09
Payer: MEDICARE

## 2023-08-09 ENCOUNTER — TELEPHONE (OUTPATIENT)
Dept: PRIMARY CARE | Facility: CLINIC | Age: 83
End: 2023-08-09

## 2023-08-09 VITALS — SYSTOLIC BLOOD PRESSURE: 122 MMHG | HEART RATE: 70 BPM | DIASTOLIC BLOOD PRESSURE: 72 MMHG

## 2023-08-09 DIAGNOSIS — F41.9 ANXIETY: Primary | ICD-10-CM

## 2023-08-09 DIAGNOSIS — R10.32 LEFT LOWER QUADRANT PAIN: ICD-10-CM

## 2023-08-09 PROCEDURE — 3074F SYST BP LT 130 MM HG: CPT | Performed by: FAMILY MEDICINE

## 2023-08-09 PROCEDURE — 3078F DIAST BP <80 MM HG: CPT | Performed by: FAMILY MEDICINE

## 2023-08-09 PROCEDURE — 1126F AMNT PAIN NOTED NONE PRSNT: CPT | Performed by: FAMILY MEDICINE

## 2023-08-09 PROCEDURE — 1159F MED LIST DOCD IN RCRD: CPT | Performed by: FAMILY MEDICINE

## 2023-08-09 PROCEDURE — 1160F RVW MEDS BY RX/DR IN RCRD: CPT | Performed by: FAMILY MEDICINE

## 2023-08-09 PROCEDURE — 99214 OFFICE O/P EST MOD 30 MIN: CPT | Performed by: FAMILY MEDICINE

## 2023-08-09 PROCEDURE — 1036F TOBACCO NON-USER: CPT | Performed by: FAMILY MEDICINE

## 2023-08-09 RX ORDER — LORAZEPAM 0.5 MG/1
0.5 TABLET ORAL 2 TIMES DAILY PRN
Qty: 30 TABLET | Refills: 3 | Status: CANCELLED | OUTPATIENT
Start: 2023-08-09 | End: 2024-04-05

## 2023-08-09 ASSESSMENT — ENCOUNTER SYMPTOMS
NERVOUS/ANXIOUS: 1
ACTIVITY CHANGE: 0
FREQUENCY: 1
CHEST TIGHTNESS: 0
APPETITE CHANGE: 0
FLANK PAIN: 0
DYSURIA: 0
ABDOMINAL PAIN: 1
ABDOMINAL DISTENTION: 0
HEMATURIA: 0

## 2023-08-09 NOTE — TELEPHONE ENCOUNTER
Left detailed message on voicemail CT abdomen and pelvis performed at [facility] is normal.

## 2023-08-09 NOTE — PROGRESS NOTES
Subjective   Patient ID: Avtar Gonsales is a 82 y.o. male who presents for No chief complaint on file..    Med Refill  Associated symptoms include abdominal pain. Pertinent negatives include no chest pain or congestion.      Patient reports that he has been going to see Dr. Vu as an outpatient a few years ago who had prescribed him benzodiazapine for sleep. He reports that his wife, who had a rare form of brain cancer, had passed away. He has been having more difficulty with sleep and has been taking this medication more regularly. Dr. Vu has since retired.     Patient recently was in the emergency room for elevated blood pressure.  He was tried on Ativan and reports that this medication did much better for him than his alprazolam for his anxiety.  He wishes to switch from alprazolam to Ativan, does not want to do this at this time because of how poorly he is feeling.     He has had some left lower quadrant discomfort.     Review of Systems   Constitutional:  Negative for activity change and appetite change.   HENT:  Negative for congestion.    Respiratory:  Negative for chest tightness.    Cardiovascular:  Negative for chest pain.   Gastrointestinal:  Positive for abdominal pain. Negative for abdominal distention.   Genitourinary:  Positive for frequency. Negative for dysuria, flank pain and hematuria.   Psychiatric/Behavioral:  The patient is nervous/anxious.        Objective   There were no vitals taken for this visit.  BSA There is no height or weight on file to calculate BSA.      Physical Exam  Constitutional:       General: He is not in acute distress.     Appearance: Normal appearance. He is normal weight. He is not ill-appearing or toxic-appearing.   HENT:      Head: Normocephalic.   Cardiovascular:      Rate and Rhythm: Normal rate and regular rhythm.   Pulmonary:      Effort: Pulmonary effort is normal.      Breath sounds: Normal breath sounds.   Abdominal:      General: Abdomen is flat. There is no  distension.      Palpations: Abdomen is soft. There is no mass.      Tenderness: There is abdominal tenderness. There is guarding. There is no right CVA tenderness or left CVA tenderness.      Comments: 2 out of 10 left lower quadrant pain no rigidity rebound slight guarding to left side   Musculoskeletal:         General: Normal range of motion.   Skin:     General: Skin is warm and dry.   Neurological:      Mental Status: He is alert.   Psychiatric:         Mood and Affect: Mood normal.         Behavior: Behavior normal.         Thought Content: Thought content normal.         Judgment: Judgment normal.       Office Visit on 05/22/2023   Component Date Value Ref Range Status    POC Rapid Strep 05/22/2023 Negative  Negative Final    SARS-CoV-2 Result 05/22/2023 NOT DETECTED  Not Detected Final    Comment: .  This assay is designed to detect the ORF1a/b and E genes of SARS-CoV-2 via   nucleic acid amplification. A Not Detected result does not preclude   2019-nCoV infection since the adequacy of sample collection and/or low viral   burden may result in presence of viral nucleic acids below the clinical   sensitivity of this test method.     Fact sheet for providers: https://www.fda.gov/media/507235/download   Fact sheet for patients: https://www.fda.gov/media/279138/download     This test has received FDA Emergency Use Authorization (EUA) and has been   verified for use by [facility] ([facility]).   This test is only authorized for the duration of time that circumstances   exist to justify the authorization of the emergency use of in vitro   diagnostic tests for the detection of SARS-CoV-2 virus and/or diagnosis of   COVID-19 infection under section 564(b)(1) of the Act, 21 U.S.C.   360bbb-3(b)(1), unless the authorization is terminated or revoked                            sooner.    [facility] is certified under CLIA-88 as   qualified to perform high  complexity testing. Testing is performed in the   [facility] laboratories located at [address] [place] [address].    SARS-CoV-2 Result 05/23/2023 NOT DETECTED  Not Detected Final    Comment: .  This assay is designed to detect the ORF1a/b and E genes of SARS-CoV-2 via   nucleic acid amplification. A Not Detected result does not preclude   2019-nCoV infection since the adequacy of sample collection and/or low viral   burden may result in presence of viral nucleic acids below the clinical   sensitivity of this test method.     Fact sheet for providers: https://www.fda.gov/media/606036/download   Fact sheet for patients: https://www.fda.gov/media/894964/download     This test has received FDA Emergency Use Authorization (EUA) and has been   verified for use by [facility] ([facility]).   This test is only authorized for the duration of time that circumstances   exist to justify the authorization of the emergency use of in vitro   diagnostic tests for the detection of SARS-CoV-2 virus and/or diagnosis of   COVID-19 infection under section 564(b)(1) of the Act, 21 U.S.C.   360bbb-3(b)(1), unless the authorization is terminated or revoked                            sooner.    [facility] is certified under CLIA-88 as   qualified to perform high complexity testing. Testing is performed in the   [facility] laboratories located at [address] [place] [address].   Office Visit on 03/21/2023   Component Date Value Ref Range Status    NONINV COLON CA DNA+OCC BLD SCRN S* 04/07/2023 Negative  Negative Final    Comment:   NEGATIVE TEST RESULT. A negative Cologuard result indicates a low likelihood that a colorectal cancer (CRC) or advanced adenoma (adenomatous polyps with more advanced pre-malignant features)  is present. The chance that a person with a negative Cologuard test has a colorectal cancer is less than 1 in 1500 (negative predictive value >99.9%) or  has an  advanced adenoma is less than  5.3% (negative predictive value 94.7%). These data are based on a prospective cross-sectional study of 10,000 individuals at average risk for colorectal cancer who were screened with both Cologuard and colonoscopy. (Nichole Singh al, N Engl J Med 2014;370(14):6451-7576) The normal value (reference range) for this assay is negative.    COLOGUARD RE-SCREENING RECOMMENDATION: Periodic colorectal cancer screening is an important part of preventive healthcare for asymptomatic individuals at average risk for colorectal cancer.  Following a negative Cologuard result, the American Cancer Society and U.S.                            Multi-Society Task Force screening guidelines recommend a Cologuard re-screening interval of 3 years.   References: American Cancer Society Guideline for Colorectal Cancer Screening: https://www.cancer.org/cancer/colon-rectal-cancer/dcidtxqbe-vewuywsdn-qwrzymd/acs-recommendations.html.; Jameel DK, Ailyn MARION, Ignacia WHALENK, Colorectal Cancer Screening: Recommendations for Physicians and Patients from the U.S. Multi-Society Task Force on Colorectal Cancer Screening , Am J Gastroenterology 2017; 112:7767-0842.    TEST DESCRIPTION: Composite algorithmic analysis of stool DNA-biomarkers with hemoglobin immunoassay.   Quantitative values of individual biomarkers are not reportable and are not associated with individual biomarker result reference ranges. Cologuard is intended for colorectal cancer screening of adults of either sex, 45 years or older, who are at average-risk for colorectal cancer (CRC). Cologuard has been approved for use by the U.S. FDA. The performance of Cologuard was                            established in a cross sectional study of average-risk adults aged 50-84. Cologuard performance in patients ages 45 to 49 years was estimated by sub-group analysis of near-age groups. Colonoscopies performed for a positive result may find as the most  clinically significant lesion: colorectal cancer [4.0%], advanced adenoma (including sessile serrated polyps greater than or equal to 1cm diameter) [20%] or non- advanced adenoma [31%]; or no colorectal neoplasia [45%]. These estimates are derived from a prospective cross-sectional screening study of 10,000 individuals at average risk for colorectal cancer who were screened with both Cologuard and colonoscopy. (Nichole AARON et al, N Engl J Med 2014;370(14):1386-9669.) Cologuard may produce a false negative or false positive result (no colorectal cancer or precancerous polyp present at colonoscopy follow up). A negative Cologuard test result does not guarantee the absence of CRC or advanced adenoma (pre-cancer). The current Cologuard                            screening interval is every 3 years. (American Cancer Society and U.S. Multi-Society Task Force). Cologuard performance data in a 10,000 patient pivotal study using colonoscopy as the reference method can be accessed at the following location: [email or web address]. Additional description of the Cologuard test process, warnings and precautions can be found at [email or web address].       Current Outpatient Medications on File Prior to Visit   Medication Sig Dispense Refill    amLODIPine (Norvasc) 2.5 mg tablet Take 1 tablet (2.5 mg) by mouth once daily.      ezetimibe (Zetia) 10 mg tablet Take 1 tablet (10 mg) by mouth once daily.      losartan (Cozaar) 25 mg tablet Take 4 tablets (100 mg) by mouth once daily.      metoprolol succinate XL (Toprol-XL) 100 mg 24 hr tablet Take 1 tablet (100 mg) by mouth once daily.      [DISCONTINUED] ALPRAZolam (Niravam) 0.25 mg disintegrating tablet Take 1 tablet (0.25 mg) by mouth once daily as needed for anxiety. 30 tablet 2    [DISCONTINUED] chlorthalidone (Hygroton) 25 mg tablet Take 0.5 tablets (12.5 mg) by mouth once daily.      [DISCONTINUED] mirabegron (Myrbetriq) 50 mg tablet extended release 24 hr 24 hr tablet Take 1  tablet (50 mg) by mouth once daily.       No current facility-administered medications on file prior to visit.     No images are attached to the encounter.      OARRS:  No data recorded  I have personally reviewed the OARRS report for Avtar Gonsales. I have considered the risks of abuse, dependence, addiction and diversion    Is the patient prescribed a combination of a benzodiazepine and opioid?  No    Last Urine Drug Screen / ordered today: Yes  No results found for this or any previous visit (from the past 20027 hour(s)).    Results are as expected.     Controlled Substance Agreement:  Date of the Last Agreement: 12/28/2022  Reviewed Controlled Substance Agreement including but not limited to the benefits, risks, and alternatives to treatment with a Controlled Substance medication(s).    Benzodiazepines:  What is the patient's goal of therapy? Less anxiety  Is this being achieved with current treatment? yes    MAXIM-7:  No data recorded    Activities of Daily Living:   Is your overall impression that this patient is benefiting (symptom reduction outweighs side effects) from benzodiazepine therapy? Yes     1. Physical Functioning: Same  2. Family Relationship: Same  3. Social Relationship: Same  4. Mood: Same  5. Sleep Patterns: Same  6. Overall Function: Same      Assessment/Plan   Problem List Items Addressed This Visit       Anxiety - Primary   1. Patient to come in one month to discuss possible switch from lorazepam to ativan  2. Patient to have stat CT abdomen and pelvis with contrast for LLQ pain  3. Patient to call if questions or concerns

## 2023-08-11 ENCOUNTER — TELEPHONE (OUTPATIENT)
Dept: PRIMARY CARE | Facility: CLINIC | Age: 83
End: 2023-08-11
Payer: MEDICARE

## 2023-08-11 NOTE — TELEPHONE ENCOUNTER
Notified pharmacist Frederic at [facility], [address] to fill Alprazolam 0.25 mg, # 30 today.

## 2023-08-29 ENCOUNTER — TELEPHONE (OUTPATIENT)
Dept: PRIMARY CARE | Facility: CLINIC | Age: 83
End: 2023-08-29
Payer: MEDICARE

## 2023-08-29 NOTE — TELEPHONE ENCOUNTER
Patient left a voicemail stating he has just returned from [place] on his trip he used a few extra of the alprazolam because it was difficult to adjust to sleeping. He will run out a few days before his appointment 9-6. Has enough to last until 9-2.

## 2023-09-01 ENCOUNTER — OFFICE VISIT (OUTPATIENT)
Dept: PRIMARY CARE | Facility: CLINIC | Age: 83
End: 2023-09-01
Payer: MEDICARE

## 2023-09-01 ENCOUNTER — TELEPHONE (OUTPATIENT)
Dept: PRIMARY CARE | Facility: CLINIC | Age: 83
End: 2023-09-01

## 2023-09-01 VITALS
SYSTOLIC BLOOD PRESSURE: 121 MMHG | HEART RATE: 69 BPM | BODY MASS INDEX: 25.18 KG/M2 | WEIGHT: 151.31 LBS | OXYGEN SATURATION: 96 % | DIASTOLIC BLOOD PRESSURE: 75 MMHG

## 2023-09-01 DIAGNOSIS — F41.9 ANXIETY: Primary | ICD-10-CM

## 2023-09-01 DIAGNOSIS — H00.012 HORDEOLUM EXTERNUM OF RIGHT LOWER EYELID: ICD-10-CM

## 2023-09-01 DIAGNOSIS — H61.23 CERUMEN DEBRIS ON TYMPANIC MEMBRANE OF BOTH EARS: ICD-10-CM

## 2023-09-01 PROCEDURE — 1036F TOBACCO NON-USER: CPT | Performed by: FAMILY MEDICINE

## 2023-09-01 PROCEDURE — 1126F AMNT PAIN NOTED NONE PRSNT: CPT | Performed by: FAMILY MEDICINE

## 2023-09-01 PROCEDURE — 3078F DIAST BP <80 MM HG: CPT | Performed by: FAMILY MEDICINE

## 2023-09-01 PROCEDURE — 99214 OFFICE O/P EST MOD 30 MIN: CPT | Performed by: FAMILY MEDICINE

## 2023-09-01 PROCEDURE — 1160F RVW MEDS BY RX/DR IN RCRD: CPT | Performed by: FAMILY MEDICINE

## 2023-09-01 PROCEDURE — 3074F SYST BP LT 130 MM HG: CPT | Performed by: FAMILY MEDICINE

## 2023-09-01 PROCEDURE — 1159F MED LIST DOCD IN RCRD: CPT | Performed by: FAMILY MEDICINE

## 2023-09-01 RX ORDER — ERYTHROMYCIN 5 MG/G
OINTMENT OPHTHALMIC 4 TIMES DAILY
Qty: 3.5 G | Refills: 2 | Status: SHIPPED | OUTPATIENT
Start: 2023-09-01 | End: 2023-09-08

## 2023-09-01 RX ORDER — ALPRAZOLAM 0.25 MG/1
0.25 TABLET, ORALLY DISINTEGRATING ORAL DAILY PRN
Qty: 7 TABLET | Refills: 0 | Status: CANCELLED | OUTPATIENT
Start: 2023-09-01

## 2023-09-01 RX ORDER — ALPRAZOLAM 0.25 MG/1
0.25 TABLET, ORALLY DISINTEGRATING ORAL DAILY PRN
Qty: 30 TABLET | Refills: 2 | Status: SHIPPED | OUTPATIENT
Start: 2023-09-01 | End: 2023-11-20 | Stop reason: SDUPTHER

## 2023-09-01 RX ORDER — CEPHALEXIN 500 MG/1
500 CAPSULE ORAL 2 TIMES DAILY
Qty: 20 CAPSULE | Refills: 0 | Status: SHIPPED | OUTPATIENT
Start: 2023-09-01 | End: 2023-09-11

## 2023-09-01 RX ORDER — ALPRAZOLAM 0.25 MG/1
0.25 TABLET, ORALLY DISINTEGRATING ORAL DAILY PRN
COMMUNITY
Start: 2023-08-11 | End: 2023-09-01 | Stop reason: SDUPTHER

## 2023-09-01 ASSESSMENT — ENCOUNTER SYMPTOMS
CHEST TIGHTNESS: 0
EYE DISCHARGE: 0
EYE PAIN: 0
DYSURIA: 0
FREQUENCY: 1
ABDOMINAL DISTENTION: 0
NERVOUS/ANXIOUS: 1
APPETITE CHANGE: 0
EYE ITCHING: 0
FLANK PAIN: 0
ACTIVITY CHANGE: 0
HEMATURIA: 0
EYE REDNESS: 0
ABDOMINAL PAIN: 0

## 2023-09-01 NOTE — TELEPHONE ENCOUNTER
Gave verbal to Pharmacist Anthony Rosales to fill Alprazolam today 9-1-23 early due to short term overuse.

## 2023-09-01 NOTE — PROGRESS NOTES
Subjective   Patient ID: Avtar Gonsales is a 82 y.o. male who presents for Med Refill.    Med Refill  Pertinent negatives include no abdominal pain, chest pain or congestion.      Patient reports that he has been going to see Dr. Vu as an outpatient a few years ago who had prescribed him benzodiazapine for sleep. He reports that his wife, who had a rare form of brain cancer, had passed away. He has been having more difficulty with sleep and has been taking this medication more regularly. Dr. Vu has since retired.     Patient recently was in the emergency room for elevated blood pressure.  He was tried on Ativan and reports that this medication did much better for him than his alprazolam for his anxiety.  He wishes to switch from alprazolam to Ativan, does not want to do this at this time because of how poorly he is feeling.     He has had some left lower quadrant discomfort.     Review of Systems   Constitutional:  Negative for activity change and appetite change.   HENT:  Negative for congestion.    Eyes:  Negative for pain, discharge, redness and itching.   Respiratory:  Negative for chest tightness.    Cardiovascular:  Negative for chest pain.   Gastrointestinal:  Negative for abdominal distention and abdominal pain.   Genitourinary:  Positive for frequency. Negative for dysuria, flank pain and hematuria.   Psychiatric/Behavioral:  The patient is nervous/anxious.        Objective   /75   Pulse 69   Wt 68.6 kg (151 lb 5 oz)   SpO2 96%   BMI 25.18 kg/m²   BSA Body surface area is 1.77 meters squared.      Physical Exam  Constitutional:       General: He is not in acute distress.     Appearance: Normal appearance. He is normal weight. He is not ill-appearing or toxic-appearing.   HENT:      Head: Normocephalic.      Right Ear: There is impacted cerumen.      Left Ear: There is impacted cerumen.      Ears:      Comments: bilateral cerumen impaction appreciated a cerumen spoon was utilized to attempt to  remove wax this was unsuccessful and ear irrigation was performed until visualized clear bilaterally to tympanic membranes with otoscope  Eyes:      General:         Right eye: No discharge.         Left eye: No discharge.      Extraocular Movements: Extraocular movements intact.      Conjunctiva/sclera: Conjunctivae normal.      Pupils: Pupils are equal, round, and reactive to light.      Comments: Stye right lower eyelid   Cardiovascular:      Rate and Rhythm: Normal rate and regular rhythm.   Pulmonary:      Effort: Pulmonary effort is normal.      Breath sounds: Normal breath sounds.   Abdominal:      Comments: 2 out of 10 left lower quadrant pain no rigidity rebound slight guarding to left side   Musculoskeletal:         General: Normal range of motion.   Skin:     General: Skin is warm and dry.   Neurological:      Mental Status: He is alert.   Psychiatric:         Mood and Affect: Mood normal.         Behavior: Behavior normal.         Thought Content: Thought content normal.         Judgment: Judgment normal.       Office Visit on 05/22/2023   Component Date Value Ref Range Status    POC Rapid Strep 05/22/2023 Negative  Negative Final    SARS-CoV-2 Result 05/22/2023 NOT DETECTED  Not Detected Final    Comment: .  This assay is designed to detect the ORF1a/b and E genes of SARS-CoV-2 via   nucleic acid amplification. A Not Detected result does not preclude   2019-nCoV infection since the adequacy of sample collection and/or low viral   burden may result in presence of viral nucleic acids below the clinical   sensitivity of this test method.     Fact sheet for providers: https://www.fda.gov/media/652609/download   Fact sheet for patients: https://www.fda.gov/media/741912/download     This test has received FDA Emergency Use Authorization (EUA) and has been   verified for use by [facility] ([facility]).   This test is only authorized for the duration of time that circumstances    exist to justify the authorization of the emergency use of in vitro   diagnostic tests for the detection of SARS-CoV-2 virus and/or diagnosis of   COVID-19 infection under section 564(b)(1) of the Act, 21 U.S.C.   360bbb-3(b)(1), unless the authorization is terminated or revoked                            sooner.    [facility] is certified under CLIA-88 as   qualified to perform high complexity testing. Testing is performed in the   [facility] laboratories located at [address] [place] [address].    SARS-CoV-2 Result 05/23/2023 NOT DETECTED  Not Detected Final    Comment: .  This assay is designed to detect the ORF1a/b and E genes of SARS-CoV-2 via   nucleic acid amplification. A Not Detected result does not preclude   2019-nCoV infection since the adequacy of sample collection and/or low viral   burden may result in presence of viral nucleic acids below the clinical   sensitivity of this test method.     Fact sheet for providers: https://www.fda.gov/media/242231/download   Fact sheet for patients: https://www.fda.gov/media/596321/download     This test has received FDA Emergency Use Authorization (EUA) and has been   verified for use by [facility] ([facility]).   This test is only authorized for the duration of time that circumstances   exist to justify the authorization of the emergency use of in vitro   diagnostic tests for the detection of SARS-CoV-2 virus and/or diagnosis of   COVID-19 infection under section 564(b)(1) of the Act, 21 U.S.C.   360bbb-3(b)(1), unless the authorization is terminated or revoked                            sooner.    [facility] is certified under CLIA-88 as   qualified to perform high complexity testing. Testing is performed in the   [facility] laboratories located at [address] [place] [address].   Office Visit on 03/21/2023   Component Date Value Ref Range Status    NONINV  COLON CA DNA+OCC BLD SCRN S* 04/07/2023 Negative  Negative Final    Comment:   NEGATIVE TEST RESULT. A negative Cologuard result indicates a low likelihood that a colorectal cancer (CRC) or advanced adenoma (adenomatous polyps with more advanced pre-malignant features)  is present. The chance that a person with a negative Cologuard test has a colorectal cancer is less than 1 in 1500 (negative predictive value >99.9%) or has an  advanced adenoma is less than  5.3% (negative predictive value 94.7%). These data are based on a prospective cross-sectional study of 10,000 individuals at average risk for colorectal cancer who were screened with both Cologuard and colonoscopy. (Nichole BOWERS. et al, N Engl J Med 2014;370(14):8710-4079) The normal value (reference range) for this assay is negative.    COLOGUARD RE-SCREENING RECOMMENDATION: Periodic colorectal cancer screening is an important part of preventive healthcare for asymptomatic individuals at average risk for colorectal cancer.  Following a negative Cologuard result, the American Cancer Society and U.S.                            Multi-Society Task Force screening guidelines recommend a Cologuard re-screening interval of 3 years.   References: American Cancer Society Guideline for Colorectal Cancer Screening: https://www.cancer.org/cancer/colon-rectal-cancer/rqxqrvnpn-ewtlgavpf-mrwltdu/acs-recommendations.html.; Jameel JONES, Ailyn MARION, Ignacia WHALENK, Colorectal Cancer Screening: Recommendations for Physicians and Patients from the U.S. Multi-Society Task Force on Colorectal Cancer Screening , Am J Gastroenterology 2017; 112:9401-1645.    TEST DESCRIPTION: Composite algorithmic analysis of stool DNA-biomarkers with hemoglobin immunoassay.   Quantitative values of individual biomarkers are not reportable and are not associated with individual biomarker result reference ranges. Cologuard is intended for colorectal cancer screening of adults of either sex, 45 years or older,  who are at average-risk for colorectal cancer (CRC). Cologuard has been approved for use by the U.S. FDA. The performance of Cologuard was                            established in a cross sectional study of average-risk adults aged 50-84. Cologuard performance in patients ages 45 to 49 years was estimated by sub-group analysis of near-age groups. Colonoscopies performed for a positive result may find as the most clinically significant lesion: colorectal cancer [4.0%], advanced adenoma (including sessile serrated polyps greater than or equal to 1cm diameter) [20%] or non- advanced adenoma [31%]; or no colorectal neoplasia [45%]. These estimates are derived from a prospective cross-sectional screening study of 10,000 individuals at average risk for colorectal cancer who were screened with both Cologuard and colonoscopy. (Nichole Singh al, N Engl J Med 2014;370(14):3872-3687.) Cologuard may produce a false negative or false positive result (no colorectal cancer or precancerous polyp present at colonoscopy follow up). A negative Cologuard test result does not guarantee the absence of CRC or advanced adenoma (pre-cancer). The current Cologuard                            screening interval is every 3 years. (American Cancer Society and U.S. Multi-Society Task Force). Cologuard performance data in a 10,000 patient pivotal study using colonoscopy as the reference method can be accessed at the following location: [email or web address]/results. Additional description of the Cologuard test process, warnings and precautions can be found at [email or web address].       Current Outpatient Medications on File Prior to Visit   Medication Sig Dispense Refill    ALPRAZolam (Niravam) 0.25 mg disintegrating tablet Take 1 tablet (0.25 mg) by mouth once daily as needed for anxiety.      ezetimibe (Zetia) 10 mg tablet Take 1 tablet (10 mg) by mouth once daily.      losartan (Cozaar) 25 mg tablet Take 4 tablets (100 mg) by mouth once daily.       metoprolol succinate XL (Toprol-XL) 100 mg 24 hr tablet Take 1 tablet (100 mg) by mouth once daily.      amLODIPine (Norvasc) 2.5 mg tablet Take 1 tablet (2.5 mg) by mouth once daily.       No current facility-administered medications on file prior to visit.     No images are attached to the encounter.      OARRS:  Angie Sorto, DO on 9/1/2023  8:35 AM  I have personally reviewed the OARRS report for Avtar Gonsales. I have considered the risks of abuse, dependence, addiction and diversion    Is the patient prescribed a combination of a benzodiazepine and opioid?  No    Last Urine Drug Screen / ordered today: Yes  No results found for this or any previous visit (from the past 8760 hour(s)).    Results are as expected.     Controlled Substance Agreement:  Date of the Last Agreement: 12/28/2022  Reviewed Controlled Substance Agreement including but not limited to the benefits, risks, and alternatives to treatment with a Controlled Substance medication(s).    Benzodiazepines:  What is the patient's goal of therapy? Less anxiety  Is this being achieved with current treatment? yes    MAXIM-7:  No data recorded    Activities of Daily Living:   Is your overall impression that this patient is benefiting (symptom reduction outweighs side effects) from benzodiazepine therapy? Yes     1. Physical Functioning: Same  2. Family Relationship: Same  3. Social Relationship: Same  4. Mood: Same  5. Sleep Patterns: Same  6. Overall Function: Same      Assessment/Plan   Problem List Items Addressed This Visit       Anxiety - Primary     Other Visit Diagnoses       Hordeolum externum of right lower eyelid        Relevant Medications    cephalexin (Keflex) 500 mg capsule    erythromycin (Romycin) 5 mg/gram (0.5 %) ophthalmic ointment        1. Patient to come in one month to discuss possible switch from lorazepam to ativan  2.  Patient has signed his benzodiazepine contract 12/28/2022  3.  Patient's urine drug screen performed  8/11/2023  4.  Patient to call if questions or concerns

## 2023-09-05 ASSESSMENT — ENCOUNTER SYMPTOMS
CHILLS: 0
ACTIVITY CHANGE: 0
DIARRHEA: 0
CONSTIPATION: 0
FACIAL SWELLING: 0
ARTHRALGIAS: 0
COLOR CHANGE: 0
WHEEZING: 0
COUGH: 0
CONFUSION: 0
EYE DISCHARGE: 0
FEVER: 0
APPETITE CHANGE: 0
PALPITATIONS: 0

## 2023-09-05 NOTE — PROGRESS NOTES
"Subjective   Reason for Visit: Avtar Gonsales is an 82 y.o. male here for a Medicare Wellness visit.     Past Medical, Surgical, and Family History reviewed and updated in chart.    Reviewed all medications by prescribing practitioner or clinical pharmacist (such as prescriptions, OTCs, herbal therapies and supplements) and documented in the medical record.    HPI  Patient presents for physical exam.      Fam Hx  Mom ()  Dad ()     Exercise walks  ETOH denies  Caffeine denies  Tobacco denies     Colonoscopy 2010 due ?  Urology, prostate cancer     Patient denies other complaints.   Patient Self Assessment of Health Status  Patient Self Assessment: Fair    Nutrition and Exercise  Current Diet: Well Balanced Diet  Adequate Fluid Intake: Yes  Caffeine: Yes  Exercise Frequency: Regularly    Functional Ability/Level of Safety  Cognitive Impairment Observed: No cognitive impairment observed  Cognitive Impairment Reported: No cognitive impairment reported by patient or family    Home Safety Risk Factors: None    Patient Care Team:  Angie Sorto DO as PCP - General  Angie Sorto DO as PCP [facility]  Eben Patiño MD as Referring Physician (Urology)     Review of Systems   Constitutional:  Negative for activity change, appetite change, chills and fever.   HENT:  Negative for congestion, ear pain and facial swelling.    Eyes:  Negative for discharge.   Respiratory:  Negative for cough and wheezing.    Cardiovascular:  Negative for chest pain, palpitations and leg swelling.   Gastrointestinal:  Negative for constipation and diarrhea.   Musculoskeletal:  Negative for arthralgias.   Skin:  Negative for color change and pallor.   Neurological:  Negative for syncope.   Psychiatric/Behavioral:  Negative for confusion.        Objective   Vitals:  /66 (BP Location: Left arm)   Pulse 68   Ht 1.632 m (5' 4.25\")   Wt 68.4 kg (150 lb 12.8 oz)   BMI 25.68 kg/m²       Physical " Exam  Constitutional:       General: He is not in acute distress.     Appearance: Normal appearance. He is not toxic-appearing.   HENT:      Head: Normocephalic.      Right Ear: Tympanic membrane, ear canal and external ear normal.      Left Ear: Tympanic membrane, ear canal and external ear normal.   Eyes:      Conjunctiva/sclera: Conjunctivae normal.      Pupils: Pupils are equal, round, and reactive to light.   Cardiovascular:      Rate and Rhythm: Normal rate and regular rhythm.      Pulses: Normal pulses.      Heart sounds: Normal heart sounds.   Pulmonary:      Effort: No respiratory distress.      Breath sounds: No wheezing, rhonchi or rales.   Abdominal:      General: Bowel sounds are normal. There is no distension.      Palpations: Abdomen is soft.      Tenderness: There is no abdominal tenderness.   Musculoskeletal:         General: No swelling or tenderness.      Cervical back: No tenderness.   Skin:     Findings: No lesion or rash.   Neurological:      General: No focal deficit present.      Mental Status: He is alert and oriented to person, place, and time. Mental status is at baseline.      Gait: Gait normal.   Psychiatric:         Mood and Affect: Mood normal.         Behavior: Behavior normal.         Thought Content: Thought content normal.         Judgment: Judgment normal.         Assessment/Plan   Problem List Items Addressed This Visit    None       1. Patient's blood work discussed at this office visit  2. Patient's LDL goal <130, current LDL 96  3. Patient's TG goal <150, current , continue on TYPE IV diet  4. Patient's glucose elevated start on no added sugar diet  5. Patient to continue to see his urologist  6 Colonoscopy 2010 due ?  7. Patient to call if questions or concerns

## 2023-09-06 ENCOUNTER — OFFICE VISIT (OUTPATIENT)
Dept: PRIMARY CARE | Facility: CLINIC | Age: 83
End: 2023-09-06
Payer: MEDICARE

## 2023-09-06 VITALS
HEART RATE: 68 BPM | HEIGHT: 64 IN | SYSTOLIC BLOOD PRESSURE: 118 MMHG | DIASTOLIC BLOOD PRESSURE: 66 MMHG | WEIGHT: 150.8 LBS | BODY MASS INDEX: 25.74 KG/M2

## 2023-09-06 DIAGNOSIS — Z71.89 ADVANCE DIRECTIVE DISCUSSED WITH PATIENT: ICD-10-CM

## 2023-09-06 DIAGNOSIS — Z00.00 ROUTINE GENERAL MEDICAL EXAMINATION AT HEALTH CARE FACILITY: ICD-10-CM

## 2023-09-06 DIAGNOSIS — Z13.89 ENCOUNTER FOR SCREENING FOR OTHER DISORDER: ICD-10-CM

## 2023-09-06 DIAGNOSIS — Z00.00 HEALTHCARE MAINTENANCE: Primary | ICD-10-CM

## 2023-09-06 PROCEDURE — 1126F AMNT PAIN NOTED NONE PRSNT: CPT | Performed by: FAMILY MEDICINE

## 2023-09-06 PROCEDURE — 3078F DIAST BP <80 MM HG: CPT | Performed by: FAMILY MEDICINE

## 2023-09-06 PROCEDURE — 1158F ADVNC CARE PLAN TLK DOCD: CPT | Performed by: FAMILY MEDICINE

## 2023-09-06 PROCEDURE — G0444 DEPRESSION SCREEN ANNUAL: HCPCS | Performed by: FAMILY MEDICINE

## 2023-09-06 PROCEDURE — 1170F FXNL STATUS ASSESSED: CPT | Performed by: FAMILY MEDICINE

## 2023-09-06 PROCEDURE — 3074F SYST BP LT 130 MM HG: CPT | Performed by: FAMILY MEDICINE

## 2023-09-06 PROCEDURE — G0439 PPPS, SUBSEQ VISIT: HCPCS | Performed by: FAMILY MEDICINE

## 2023-09-06 PROCEDURE — 99497 ADVNCD CARE PLAN 30 MIN: CPT | Performed by: FAMILY MEDICINE

## 2023-09-06 PROCEDURE — 1160F RVW MEDS BY RX/DR IN RCRD: CPT | Performed by: FAMILY MEDICINE

## 2023-09-06 PROCEDURE — 1159F MED LIST DOCD IN RCRD: CPT | Performed by: FAMILY MEDICINE

## 2023-09-06 PROCEDURE — 1036F TOBACCO NON-USER: CPT | Performed by: FAMILY MEDICINE

## 2023-09-06 PROCEDURE — 99214 OFFICE O/P EST MOD 30 MIN: CPT | Performed by: FAMILY MEDICINE

## 2023-09-06 ASSESSMENT — PATIENT HEALTH QUESTIONNAIRE - PHQ9
10. IF YOU CHECKED OFF ANY PROBLEMS, HOW DIFFICULT HAVE THESE PROBLEMS MADE IT FOR YOU TO DO YOUR WORK, TAKE CARE OF THINGS AT HOME, OR GET ALONG WITH OTHER PEOPLE: NOT DIFFICULT AT ALL
2. FEELING DOWN, DEPRESSED OR HOPELESS: NOT AT ALL
SUM OF ALL RESPONSES TO PHQ9 QUESTIONS 1 AND 2: 0
1. LITTLE INTEREST OR PLEASURE IN DOING THINGS: NOT AT ALL

## 2023-09-06 ASSESSMENT — ACTIVITIES OF DAILY LIVING (ADL)
BATHING: INDEPENDENT
DOING_HOUSEWORK: INDEPENDENT
MANAGING_FINANCES: INDEPENDENT
TAKING_MEDICATION: INDEPENDENT
DRESSING: INDEPENDENT
GROCERY_SHOPPING: INDEPENDENT

## 2023-09-06 ASSESSMENT — ENCOUNTER SYMPTOMS
OCCASIONAL FEELINGS OF UNSTEADINESS: 0
LOSS OF SENSATION IN FEET: 0
DEPRESSION: 0

## 2023-09-06 NOTE — PROGRESS NOTES
"Subjective   Reason for Visit: Avtar Gonsales is an 82 y.o. male here for a Medicare Wellness visit.     Past Medical, Surgical, and Family History reviewed and updated in chart.    Reviewed all medications by prescribing practitioner or clinical pharmacist (such as prescriptions, OTCs, herbal therapies and supplements) and documented in the medical record.    HPI    Patient Care Team:  Angie Sorto DO as PCP - General  Angie Sorto DO as PCP - [facility] Attributed Provider  Eben Patiño MD as Referring Physician (Urology)     Review of Systems    Objective   Vitals:  /66 (BP Location: Left arm)   Pulse 68   Ht 1.632 m (5' 4.25\")   Wt 68.4 kg (150 lb 12.8 oz)   BMI 25.68 kg/m²       Physical Exam    Assessment/Plan   Problem List Items Addressed This Visit    None  Visit Diagnoses     Healthcare maintenance    -  Primary    Routine general medical examination at health care facility        Relevant Orders    1 Year Follow Up In [facility] - PCP - Wellness Exam             "

## 2023-09-06 NOTE — ACP (ADVANCE CARE PLANNING)
Confirming Previous Code Status:   Advance Care Planning Note     Discussion Date: 09/06/23   Discussion Participants: patient    The patient wishes to discuss Advance Care Planning today and the following is a brief summary of our discussion.     Patient has capacity to make their own medical decisions: Yes  Health Care Agent/Surrogate Decision Maker documented in chart: Yes    Documents on file and valid:  Advance Directive/Living Will: Yes   Health Care Power of : Yes  Other:     Communication of Medical Status/Prognosis:   stable    Communication of Treatment Goals/Options:   stable    Treatment Decisions  Goals of Care: quality of life is prioritized; willing to accept low-burden treatments to achieve meaningful goals   Quality vs quantity  Follow Up Plan  stable  Team Members  stable  Time Statement: Total face to face time spent on advance care planning was 5 minutes with 16 minutes spent in counseling, including the explanation.    Angie Sorto DO  9/6/2023 9:30 AM

## 2023-09-15 ENCOUNTER — APPOINTMENT (OUTPATIENT)
Dept: PRIMARY CARE | Facility: CLINIC | Age: 83
End: 2023-09-15
Payer: MEDICARE

## 2023-09-19 ENCOUNTER — CLINICAL SUPPORT (OUTPATIENT)
Dept: PRIMARY CARE | Facility: CLINIC | Age: 83
End: 2023-09-19
Payer: MEDICARE

## 2023-09-19 ENCOUNTER — APPOINTMENT (OUTPATIENT)
Dept: PRIMARY CARE | Facility: CLINIC | Age: 83
End: 2023-09-19
Payer: MEDICARE

## 2023-09-19 DIAGNOSIS — Z23 ENCOUNTER FOR IMMUNIZATION: ICD-10-CM

## 2023-09-19 PROCEDURE — 90686 IIV4 VACC NO PRSV 0.5 ML IM: CPT | Performed by: FAMILY MEDICINE

## 2023-09-19 PROCEDURE — G0008 ADMIN INFLUENZA VIRUS VAC: HCPCS | Performed by: FAMILY MEDICINE

## 2023-09-19 NOTE — PROGRESS NOTES
Pt presents for annual flu vaccine per Dr Sorto. 0.5 mL given IM in R delt; no issues w/ injection. Pt tolerated well.   He requested the reg flu vaccine for personal reasons.

## 2023-10-24 ENCOUNTER — OFFICE VISIT (OUTPATIENT)
Dept: UROLOGY | Facility: HOSPITAL | Age: 83
End: 2023-10-24
Payer: MEDICARE

## 2023-10-24 DIAGNOSIS — R35.0 URINARY FREQUENCY: Primary | ICD-10-CM

## 2023-10-24 DIAGNOSIS — Z85.46 H/O PROSTATE CANCER: ICD-10-CM

## 2023-10-24 DIAGNOSIS — Z00.00 ROUTINE GENERAL MEDICAL EXAMINATION AT HEALTH CARE FACILITY: ICD-10-CM

## 2023-10-24 PROCEDURE — 1160F RVW MEDS BY RX/DR IN RCRD: CPT | Performed by: UROLOGY

## 2023-10-24 PROCEDURE — 1126F AMNT PAIN NOTED NONE PRSNT: CPT | Performed by: UROLOGY

## 2023-10-24 PROCEDURE — 99214 OFFICE O/P EST MOD 30 MIN: CPT | Performed by: UROLOGY

## 2023-10-24 PROCEDURE — 99204 OFFICE O/P NEW MOD 45 MIN: CPT | Performed by: UROLOGY

## 2023-10-24 PROCEDURE — 1159F MED LIST DOCD IN RCRD: CPT | Performed by: UROLOGY

## 2023-10-24 PROCEDURE — 1158F ADVNC CARE PLAN TLK DOCD: CPT | Performed by: UROLOGY

## 2023-10-24 PROCEDURE — 1036F TOBACCO NON-USER: CPT | Performed by: UROLOGY

## 2023-10-24 NOTE — PROGRESS NOTES
NAME:Avtar Gonsales  DATE: 10/24/2023               Subjective:   Chief complaint: OAB / axonics therapy consult     HPI:  83 y.o. male presenting for evaluation of OAB and axoncis therapy consult       HPI:   Erectile Dysfunction: prostatectomy 2013 for PCa, PSA been UDT  Able to obtain - No  Able to maintain - No  Curvature - No N/A  Libido - Good  Prior treatments - ROHINI, Cialsis ( had headaches/ not effective), injections   Relationship status - Single  Sexual Partners - Female     Voiding Symptoms  Frequency: Q 2 hours   Urgency: Yes  Urge Incontinence: No  Nocturia: 4 times per night Sleep Disorder: No  Stream: strong  Hesitancy: No  Straining: No  Intermittency: No  Sensation of Incomplete Emptying: No  Stress Incontinence: No  Pads:  Yes 1per day   Dysuria:  No  Gross Hematuria:  No  UTI:  No  Stones:  Yes - 40 years ago. See nephrologist proteinuria     Consumes 40 - 50 oz of fluid per day.     Yes medications for his bladder in the past.  Has used: Been on anticholinergics and gemtesa  Gemtesa 75mg daily ( not effective/ bad reaction)  Myrbetriq 25 mg daily ( not effective)  Myrbetriq 50mg daily ( had hypertension )     Hypertension/ sodium dropped with the medications.     Nourodynamic testing in the past.     Bowel Function:   Pt has some constipation. BM's Q 1 days. - multiple times a day.    Past Medical History:   Diagnosis Date    Allergic     Anxiety     Arthritis     Cataract     Encounter for general adult medical examination without abnormal findings     Wellness examination    HL (hearing loss)     Hordeolum externum unspecified eye, unspecified eyelid 09/25/2017    Stye    Hypertension     Hypomagnesemia 11/01/2013    Hypomagnesemia    Personal history of diseases of the blood and blood-forming organs and certain disorders involving the immune mechanism 03/27/2014    History of anemia    Personal history of other diseases of the musculoskeletal system and connective tissue 03/03/2016    History  of muscle pain    Personal history of other diseases of the nervous system and sense organs 09/12/2016    History of acute otitis externa    Personal history of other diseases of the respiratory system 12/15/2016    History of acute bronchitis    Personal history of other specified conditions 03/27/2014    History of fatigue    Personal history of other specified conditions 09/04/2014    History of abnormal weight loss     Past Surgical History:   Procedure Laterality Date    COLON SURGERY  09/04/2014    Colon Surgery    COLOSTOMY  09/04/2014    Colostomy    EYE SURGERY      PROSTATE SURGERY  09/04/2014    Prostate Surgery     Social History     Tobacco Use    Smoking status: Never     Passive exposure: Past (Dad smoked)    Smokeless tobacco: Never   Substance Use Topics    Alcohol use: Not Currently     Comment: 1 glass a week     Family History   Problem Relation Name Age of Onset    Dementia Mother      Lung cancer Father Reji Gonsales     Cancer Father Reji Gonsales     Lymphoma Brother      Multiple myeloma Sibling      Alzheimer's disease Other Mult. fam members     Cancer Other Mult. fam members     Hypertension Brother Rubens Gonsales      Current Outpatient Medications on File Prior to Visit   Medication Sig Dispense Refill    ALPRAZolam (Niravam) 0.25 mg disintegrating tablet Take 1 tablet (0.25 mg) by mouth once daily as needed for anxiety. 30 tablet 2    amLODIPine (Norvasc) 2.5 mg tablet Take 1 tablet (2.5 mg) by mouth once daily.      ezetimibe (Zetia) 10 mg tablet Take 1 tablet (10 mg) by mouth once daily.      losartan (Cozaar) 25 mg tablet Take 4 tablets (100 mg) by mouth once daily.      metoprolol succinate XL (Toprol-XL) 100 mg 24 hr tablet Take 1 tablet (100 mg) by mouth once daily.       No current facility-administered medications on file prior to visit.     Avtar is allergic to amoxicillin-pot clavulanate, codeine, and hydroxychloroquine.     Review of Systems    14 point ROS reviewed and  discussed with the patient. Pertinent positives/negatives discussed in the History of Present Illness (HPI).    FH:  Prostate CA: Yes   Bladder CA: No  Kidney CA: No  Stones: Yes - self and father     Social History  Occupation: retired   Tob: No  Etoh: Yes - socially   Cardiologist - retired, teaches at [employer]    Objective:     There is no height or weight on file to calculate BMI.   There were no vitals taken for this visit.     Physical Exam   1. Constitutional: NAD, Well-developed, Well-nourished  2. Respiratory: Unlabored breathing, no audible wheezes, no use of accessory muscles   3. Cardiovascular: No JVD, extremities perfused, no edema  4. Abdomen: Soft, non-tender, non-distended, no masses or hernia.  No CVA tenderness.    5. Skin: no visible lesions, plaque, rashes, jaundice  6. Neuro: Gait normal, no focal neurologic deficit  7. Psychiatric: Mood and affect normal and appropriate, alert and oriented      Assessment/Plan:   Avtar Gonsales presents with       1. Urinary frequency    2. Routine general medical examination at health care facility    3. H/O prostate cancer        Pt with h/o PCa s/p radical prostatectomy with bothersome OAB.  Failed conservative measures, failed multiple medications (anti-cholinergics and beta-agonists).  Interested in Axonics [facility].    I discussed the treatment pathways with PNE, Stage 1 and Stage 2.  We would plan to do PNE in office and have him keep diary for 1wk after to assess for a successful response (>50% improvement).  Leads would be removed in the office.  Plan to do stage 1/2 if successful, could do stage 1 with inconclusive.  Risks of pain, bleeding, infection discussed.  Risk that it may not work well and would need to be removed.      Pt interested in proceeding.  Will plan for PNE in office early December.       No follow-ups on file.

## 2023-10-27 ENCOUNTER — PREP FOR PROCEDURE (OUTPATIENT)
Dept: SURGERY | Facility: HOSPITAL | Age: 83
End: 2023-10-27
Payer: MEDICARE

## 2023-10-27 ENCOUNTER — HOSPITAL ENCOUNTER (OUTPATIENT)
Facility: HOSPITAL | Age: 83
Setting detail: OUTPATIENT SURGERY
End: 2023-10-27
Attending: UROLOGY | Admitting: UROLOGY
Payer: MEDICARE

## 2023-10-27 DIAGNOSIS — N32.81 OAB (OVERACTIVE BLADDER): Primary | ICD-10-CM

## 2023-10-27 DIAGNOSIS — N30.11 INTERSTITIAL CYSTITIS (CHRONIC) WITH HEMATURIA: ICD-10-CM

## 2023-10-27 DIAGNOSIS — R78.81 BACTEREMIA: ICD-10-CM

## 2023-10-27 RX ORDER — SODIUM CHLORIDE, SODIUM LACTATE, POTASSIUM CHLORIDE, CALCIUM CHLORIDE 600; 310; 30; 20 MG/100ML; MG/100ML; MG/100ML; MG/100ML
100 INJECTION, SOLUTION INTRAVENOUS CONTINUOUS
Status: CANCELLED | OUTPATIENT
Start: 2023-10-27

## 2023-10-27 RX ORDER — CHLORHEXIDINE GLUCONATE 40 MG/ML
SOLUTION TOPICAL DAILY PRN
Status: CANCELLED | OUTPATIENT
Start: 2023-10-27

## 2023-11-20 ENCOUNTER — OFFICE VISIT (OUTPATIENT)
Dept: PRIMARY CARE | Facility: CLINIC | Age: 83
End: 2023-11-20
Payer: MEDICARE

## 2023-11-20 VITALS
SYSTOLIC BLOOD PRESSURE: 130 MMHG | BODY MASS INDEX: 26.16 KG/M2 | WEIGHT: 153.6 LBS | DIASTOLIC BLOOD PRESSURE: 72 MMHG | HEART RATE: 67 BPM

## 2023-11-20 DIAGNOSIS — F41.9 ANXIETY: Primary | ICD-10-CM

## 2023-11-20 PROCEDURE — 1160F RVW MEDS BY RX/DR IN RCRD: CPT | Performed by: FAMILY MEDICINE

## 2023-11-20 PROCEDURE — 99214 OFFICE O/P EST MOD 30 MIN: CPT | Performed by: FAMILY MEDICINE

## 2023-11-20 PROCEDURE — 1126F AMNT PAIN NOTED NONE PRSNT: CPT | Performed by: FAMILY MEDICINE

## 2023-11-20 PROCEDURE — 1036F TOBACCO NON-USER: CPT | Performed by: FAMILY MEDICINE

## 2023-11-20 PROCEDURE — 3078F DIAST BP <80 MM HG: CPT | Performed by: FAMILY MEDICINE

## 2023-11-20 PROCEDURE — 3075F SYST BP GE 130 - 139MM HG: CPT | Performed by: FAMILY MEDICINE

## 2023-11-20 PROCEDURE — 1158F ADVNC CARE PLAN TLK DOCD: CPT | Performed by: FAMILY MEDICINE

## 2023-11-20 PROCEDURE — 1159F MED LIST DOCD IN RCRD: CPT | Performed by: FAMILY MEDICINE

## 2023-11-20 RX ORDER — ALPRAZOLAM 0.25 MG/1
0.25 TABLET, ORALLY DISINTEGRATING ORAL 3 TIMES DAILY PRN
Qty: 60 TABLET | Refills: 2 | Status: SHIPPED | OUTPATIENT
Start: 2023-11-20 | End: 2024-03-04 | Stop reason: SDUPTHER

## 2023-11-20 ASSESSMENT — ENCOUNTER SYMPTOMS
APPETITE CHANGE: 0
ABDOMINAL PAIN: 0
CHEST TIGHTNESS: 0
EYE PAIN: 0
FLANK PAIN: 0
HEMATURIA: 0
ACTIVITY CHANGE: 0
DYSURIA: 0
NERVOUS/ANXIOUS: 1
EYE ITCHING: 0
EYE REDNESS: 0
FREQUENCY: 1
ABDOMINAL DISTENTION: 0
EYE DISCHARGE: 0

## 2023-11-20 ASSESSMENT — ANXIETY QUESTIONNAIRES
4. TROUBLE RELAXING: SEVERAL DAYS
7. FEELING AFRAID AS IF SOMETHING AWFUL MIGHT HAPPEN: NOT AT ALL
IF YOU CHECKED OFF ANY PROBLEMS ON THIS QUESTIONNAIRE, HOW DIFFICULT HAVE THESE PROBLEMS MADE IT FOR YOU TO DO YOUR WORK, TAKE CARE OF THINGS AT HOME, OR GET ALONG WITH OTHER PEOPLE: NOT DIFFICULT AT ALL
5. BEING SO RESTLESS THAT IT IS HARD TO SIT STILL: NOT AT ALL
GAD7 TOTAL SCORE: 5
6. BECOMING EASILY ANNOYED OR IRRITABLE: NOT AT ALL
3. WORRYING TOO MUCH ABOUT DIFFERENT THINGS: SEVERAL DAYS
1. FEELING NERVOUS, ANXIOUS, OR ON EDGE: SEVERAL DAYS
2. NOT BEING ABLE TO STOP OR CONTROL WORRYING: MORE THAN HALF THE DAYS

## 2023-11-20 NOTE — PROGRESS NOTES
Subjective   Patient ID: Avtar Gonsales is a 83 y.o. male who presents for Med Refill.    Med Refill  Pertinent negatives include no abdominal pain, chest pain or congestion.      Patient reports that he has been going to see Dr. Vu as an outpatient a few years ago who had prescribed him benzodiazapine for sleep. He reports that his wife, who had a rare form of brain cancer, had passed away. He has been having more difficulty with sleep and has been taking this medication more regularly. Dr. Vu has since retired.     Patient recently was in the emergency room for elevated blood pressure.  He was tried on Ativan and reports that this medication did much better for him than his alprazolam for his anxiety.  He wishes to switch from alprazolam to Ativan, does not want to do this at this time because of how poorly he is feeling.     He has had some left lower quadrant discomfort.     Review of Systems   Constitutional:  Negative for activity change and appetite change.   HENT:  Negative for congestion.    Eyes:  Negative for pain, discharge, redness and itching.   Respiratory:  Negative for chest tightness.    Cardiovascular:  Negative for chest pain.   Gastrointestinal:  Negative for abdominal distention and abdominal pain.   Genitourinary:  Positive for frequency. Negative for dysuria, flank pain and hematuria.   Psychiatric/Behavioral:  The patient is nervous/anxious.        Objective   /72 (BP Location: Right arm)   Pulse 67   Wt 69.7 kg (153 lb 9.6 oz)   BMI 26.16 kg/m²   BSA Body surface area is 1.78 meters squared.      Physical Exam  Constitutional:       General: He is not in acute distress.     Appearance: Normal appearance. He is normal weight. He is not ill-appearing or toxic-appearing.   HENT:      Head: Normocephalic.      Right Ear: There is impacted cerumen.      Left Ear: There is impacted cerumen.      Ears:      Comments: bilateral cerumen impaction appreciated a cerumen spoon was  utilized to attempt to remove wax this was unsuccessful and ear irrigation was performed until visualized clear bilaterally to tympanic membranes with otoscope  Eyes:      General:         Right eye: No discharge.         Left eye: No discharge.      Extraocular Movements: Extraocular movements intact.      Conjunctiva/sclera: Conjunctivae normal.      Pupils: Pupils are equal, round, and reactive to light.      Comments: Stye right lower eyelid   Cardiovascular:      Rate and Rhythm: Normal rate and regular rhythm.   Pulmonary:      Effort: Pulmonary effort is normal.      Breath sounds: Normal breath sounds.   Abdominal:      Comments: 2 out of 10 left lower quadrant pain no rigidity rebound slight guarding to left side   Musculoskeletal:         General: Normal range of motion.   Skin:     General: Skin is warm and dry.   Neurological:      Mental Status: He is alert.   Psychiatric:         Mood and Affect: Mood normal.         Behavior: Behavior normal.         Thought Content: Thought content normal.         Judgment: Judgment normal.       Office Visit on 05/22/2023   Component Date Value Ref Range Status    POC Rapid Strep 05/22/2023 Negative  Negative Final    SARS-CoV-2 Result 05/22/2023 NOT DETECTED  Not Detected Final    Comment: .  This assay is designed to detect the ORF1a/b and E genes of SARS-CoV-2 via   nucleic acid amplification. A Not Detected result does not preclude   2019-nCoV infection since the adequacy of sample collection and/or low viral   burden may result in presence of viral nucleic acids below the clinical   sensitivity of this test method.     Fact sheet for providers: https://www.fda.gov/media/618490/download   Fact sheet for patients: https://www.fda.gov/media/478007/download     This test has received FDA Emergency Use Authorization (EUA) and has been   verified for use by [facility] ([facility]).   This test is only authorized for the duration of time  that circumstances   exist to justify the authorization of the emergency use of in vitro   diagnostic tests for the detection of SARS-CoV-2 virus and/or diagnosis of   COVID-19 infection under section 564(b)(1) of the Act, 21 U.S.C.   360bbb-3(b)(1), unless the authorization is terminated or revoked                            sooner.    [facility] is certified under CLIA-88 as   qualified to perform high complexity testing. Testing is performed in the   [facility] laboratories located at [address] [place] [address].    SARS-CoV-2 Result 05/23/2023 NOT DETECTED  Not Detected Final    Comment: .  This assay is designed to detect the ORF1a/b and E genes of SARS-CoV-2 via   nucleic acid amplification. A Not Detected result does not preclude   2019-nCoV infection since the adequacy of sample collection and/or low viral   burden may result in presence of viral nucleic acids below the clinical   sensitivity of this test method.     Fact sheet for providers: https://www.fda.gov/media/658260/download   Fact sheet for patients: https://www.fda.gov/media/438412/download     This test has received FDA Emergency Use Authorization (EUA) and has been   verified for use by [facility] ([facility]).   This test is only authorized for the duration of time that circumstances   exist to justify the authorization of the emergency use of in vitro   diagnostic tests for the detection of SARS-CoV-2 virus and/or diagnosis of   COVID-19 infection under section 564(b)(1) of the Act, 21 U.S.C.   360bbb-3(b)(1), unless the authorization is terminated or revoked                            sooner.    [facility] is certified under CLIA-88 as   qualified to perform high complexity testing. Testing is performed in the   [facility] laboratories located at [address] [place] [address].   Office Visit on 03/21/2023   Component Date Value Ref  Range Status    NONINV COLON CA DNA+OCC BLD SCRN S* 04/07/2023 Negative  Negative Final    Comment:   NEGATIVE TEST RESULT. A negative Cologuard result indicates a low likelihood that a colorectal cancer (CRC) or advanced adenoma (adenomatous polyps with more advanced pre-malignant features)  is present. The chance that a person with a negative Cologuard test has a colorectal cancer is less than 1 in 1500 (negative predictive value >99.9%) or has an  advanced adenoma is less than  5.3% (negative predictive value 94.7%). These data are based on a prospective cross-sectional study of 10,000 individuals at average risk for colorectal cancer who were screened with both Cologuard and colonoscopy. (Nichole BOWERS. et al, N Engl J Med 2014;370(14):9131-7935) The normal value (reference range) for this assay is negative.    COLOGUARD RE-SCREENING RECOMMENDATION: Periodic colorectal cancer screening is an important part of preventive healthcare for asymptomatic individuals at average risk for colorectal cancer.  Following a negative Cologuard result, the American Cancer Society and U.S.                            Multi-Society Task Force screening guidelines recommend a Cologuard re-screening interval of 3 years.   References: American Cancer Society Guideline for Colorectal Cancer Screening: https://www.cancer.org/cancer/colon-rectal-cancer/gvmyojdqy-qdgdtilwx-xsaygnn/acs-recommendations.html.; Jameel JONES, Ailyn MARION, Ignacia WHALENK, Colorectal Cancer Screening: Recommendations for Physicians and Patients from the U.S. Multi-Society Task Force on Colorectal Cancer Screening , Am J Gastroenterology 2017; 112:9713-3530.    TEST DESCRIPTION: Composite algorithmic analysis of stool DNA-biomarkers with hemoglobin immunoassay.   Quantitative values of individual biomarkers are not reportable and are not associated with individual biomarker result reference ranges. Cologuard is intended for colorectal cancer screening of adults of either  sex, 45 years or older, who are at average-risk for colorectal cancer (CRC). Cologuard has been approved for use by the U.S. FDA. The performance of Cologuard was                            established in a cross sectional study of average-risk adults aged 50-84. Cologuard performance in patients ages 45 to 49 years was estimated by sub-group analysis of near-age groups. Colonoscopies performed for a positive result may find as the most clinically significant lesion: colorectal cancer [4.0%], advanced adenoma (including sessile serrated polyps greater than or equal to 1cm diameter) [20%] or non- advanced adenoma [31%]; or no colorectal neoplasia [45%]. These estimates are derived from a prospective cross-sectional screening study of 10,000 individuals at average risk for colorectal cancer who were screened with both Cologuard and colonoscopy. (Nichole Singh al, N Engl J Med 2014;370(14):4943-9721.) Cologuard may produce a false negative or false positive result (no colorectal cancer or precancerous polyp present at colonoscopy follow up). A negative Cologuard test result does not guarantee the absence of CRC or advanced adenoma (pre-cancer). The current Cologuard                            screening interval is every 3 years. (American Cancer Society and U.S. Multi-Society Task Force). Cologuard performance data in a 10,000 patient pivotal study using colonoscopy as the reference method can be accessed at the following location: www.Zuznow.Wisr/results. Additional description of the Cologuard test process, warnings and precautions can be found at www.[employer].com.       Current Outpatient Medications on File Prior to Visit   Medication Sig Dispense Refill    ezetimibe (Zetia) 10 mg tablet Take 1 tablet (10 mg) by mouth once daily.      losartan (Cozaar) 25 mg tablet Take 4 tablets (100 mg) by mouth once daily.      metoprolol succinate XL (Toprol-XL) 100 mg 24 hr tablet Take 1 tablet (100 mg) by mouth once  daily.      [DISCONTINUED] ALPRAZolam (Niravam) 0.25 mg disintegrating tablet Take 1 tablet (0.25 mg) by mouth once daily as needed for anxiety. 30 tablet 2     No current facility-administered medications on file prior to visit.     No images are attached to the encounter.      OARRS:  Angie Sorto DO on 2023  7:24 AM  I have personally reviewed the OARRS report for Avtar Gonsales. I have considered the risks of abuse, dependence, addiction and diversion    Is the patient prescribed a combination of a benzodiazepine and opioid?  No    Last Urine Drug Screen / ordered today: Yes  No results found for this or any previous visit (from the past 8760 hour(s)).    Results are as expected.     Controlled Substance Agreement:  Date of the Last Agreement: 2022  Reviewed Controlled Substance Agreement including but not limited to the benefits, risks, and alternatives to treatment with a Controlled Substance medication(s).    Benzodiazepines:  What is the patient's goal of therapy? Less anxiety  Is this being achieved with current treatment? yes    MAXIM-7:  Over the last 2 weeks, how often have you been bothered by any of the following problems?  Feeling nervous, anxious, or on edge: 1  Not being able to stop or control worryin  Worrying too much about different things: 1  Trouble relaxin  Being so restless that it is hard to sit still: 0  Becoming easily annoyed or irritable: 0  Feeling afraid as if something awful might happen: 0  MAXIM-7 Total Score: 5        Activities of Daily Living:   Is your overall impression that this patient is benefiting (symptom reduction outweighs side effects) from benzodiazepine therapy? Yes     1. Physical Functioning: Same  2. Family Relationship: Same  3. Social Relationship: Same  4. Mood: Same  5. Sleep Patterns: Same  6. Overall Function: Same      Assessment/Plan   Problem List Items Addressed This Visit       Anxiety - Primary    Relevant Medications    ALPRAZolam  (Niravam) 0.25 mg disintegrating tablet     1. Patient to come in one month to discuss possible switch from lorazepam to ativan  2.  Patient has signed his benzodiazepine contract 12/28/2022  3.  Patient's urine drug screen performed 8/11/2023  4.  Patient to call if questions or concerns

## 2023-11-22 ENCOUNTER — HOSPITAL ENCOUNTER (OUTPATIENT)
Dept: CARDIOLOGY | Facility: CLINIC | Age: 83
Discharge: HOME | End: 2023-11-22
Payer: MEDICARE

## 2023-11-22 DIAGNOSIS — N32.81 OAB (OVERACTIVE BLADDER): ICD-10-CM

## 2023-11-22 PROCEDURE — 93005 ELECTROCARDIOGRAM TRACING: CPT

## 2023-11-22 PROCEDURE — 93010 ELECTROCARDIOGRAM REPORT: CPT | Performed by: INTERNAL MEDICINE

## 2023-11-25 LAB
ATRIAL RATE: 65 BPM
P AXIS: 67 DEGREES
P OFFSET: 202 MS
P ONSET: 135 MS
PR INTERVAL: 150 MS
Q ONSET: 210 MS
QRS COUNT: 11 BEATS
QRS DURATION: 94 MS
QT INTERVAL: 386 MS
QTC CALCULATION(BAZETT): 401 MS
QTC FREDERICIA: 396 MS
R AXIS: -25 DEGREES
T AXIS: 39 DEGREES
T OFFSET: 403 MS
VENTRICULAR RATE: 65 BPM

## 2023-12-01 ENCOUNTER — TELEPHONE (OUTPATIENT)
Dept: PREADMISSION TESTING | Facility: HOSPITAL | Age: 83
End: 2023-12-01
Payer: MEDICARE

## 2023-12-01 ENCOUNTER — APPOINTMENT (OUTPATIENT)
Dept: UROLOGY | Facility: HOSPITAL | Age: 83
End: 2023-12-01
Payer: MEDICARE

## 2023-12-08 ENCOUNTER — APPOINTMENT (OUTPATIENT)
Dept: UROLOGY | Facility: HOSPITAL | Age: 83
End: 2023-12-08
Payer: MEDICARE

## 2023-12-21 DIAGNOSIS — I10 PRIMARY HYPERTENSION: ICD-10-CM

## 2023-12-22 RX ORDER — METOPROLOL SUCCINATE 100 MG/1
100 TABLET, EXTENDED RELEASE ORAL DAILY
Qty: 30 TABLET | Refills: 11 | Status: SHIPPED | OUTPATIENT
Start: 2023-12-22

## 2024-01-11 ENCOUNTER — OFFICE VISIT (OUTPATIENT)
Dept: UROLOGY | Facility: HOSPITAL | Age: 84
End: 2024-01-11
Payer: MEDICARE

## 2024-01-11 DIAGNOSIS — N32.81 OAB (OVERACTIVE BLADDER): ICD-10-CM

## 2024-01-11 DIAGNOSIS — R35.0 URINARY FREQUENCY: Primary | ICD-10-CM

## 2024-01-11 DIAGNOSIS — Z85.46 H/O PROSTATE CANCER: ICD-10-CM

## 2024-01-11 PROCEDURE — 1126F AMNT PAIN NOTED NONE PRSNT: CPT | Performed by: UROLOGY

## 2024-01-11 PROCEDURE — 1158F ADVNC CARE PLAN TLK DOCD: CPT | Performed by: UROLOGY

## 2024-01-11 PROCEDURE — 1036F TOBACCO NON-USER: CPT | Performed by: UROLOGY

## 2024-01-11 PROCEDURE — 1160F RVW MEDS BY RX/DR IN RCRD: CPT | Performed by: UROLOGY

## 2024-01-11 PROCEDURE — 99214 OFFICE O/P EST MOD 30 MIN: CPT | Performed by: UROLOGY

## 2024-01-11 ASSESSMENT — PAIN SCALES - GENERAL: PAINLEVEL: 0-NO PAIN

## 2024-01-11 NOTE — PROGRESS NOTES
NAME:Avtar Gonsales  DATE: 1/11/2024               Subjective:   Chief complaint: OAB / axonics therapy consult     Last seen 10/24/23    HPI:  83 y.o. male presenting for evaluation of OAB and axoncis therapy consult       HPI:   Erectile Dysfunction: prostatectomy 2013 for PCa, PSA been UDT  Able to obtain - No  Able to maintain - No  Curvature - No N/A  Libido - Good  Prior treatments - ROHINI, Cialsis ( had headaches/ not effective), injections   Relationship status - Single  Sexual Partners - Female     Voiding Symptoms  Frequency: Q 2 hours   Urgency: Yes  Urge Incontinence: No  Nocturia: 4 times per night Sleep Disorder: No  Stream: strong  Hesitancy: No  Straining: No  Intermittency: No  Sensation of Incomplete Emptying: No  Stress Incontinence: No  Pads:  Yes 1per day   Dysuria:  No  Gross Hematuria:  No  UTI:  No  Stones:  Yes - 40 years ago. See nephrologist proteinuria     Consumes 40 - 50 oz of fluid per day.     Yes medications for his bladder in the past.  Has used: Been on anticholinergics and gemtesa  Gemtesa 75mg daily ( not effective/ bad reaction)  Myrbetriq 25 mg daily ( not effective)  Myrbetriq 50mg daily ( had hypertension )     Hypertension/ sodium dropped with the medications.     Nourodynamic testing in the past.     Bowel Function:   Pt has some constipation. BM's Q 1 days. - multiple times a day.    Past Medical History:   Diagnosis Date    Allergic     Anxiety     Arthritis     Cataract     Encounter for general adult medical examination without abnormal findings     Wellness examination    HL (hearing loss)     Hordeolum externum unspecified eye, unspecified eyelid 09/25/2017    Stye    Hypertension     Hypomagnesemia 11/01/2013    Hypomagnesemia    Personal history of diseases of the blood and blood-forming organs and certain disorders involving the immune mechanism 03/27/2014    History of anemia    Personal history of other diseases of the musculoskeletal system and connective tissue  03/03/2016    History of muscle pain    Personal history of other diseases of the nervous system and sense organs 09/12/2016    History of acute otitis externa    Personal history of other diseases of the respiratory system 12/15/2016    History of acute bronchitis    Personal history of other specified conditions 03/27/2014    History of fatigue    Personal history of other specified conditions 09/04/2014    History of abnormal weight loss     Past Surgical History:   Procedure Laterality Date    COLON SURGERY  09/04/2014    Colon Surgery    COLOSTOMY  09/04/2014    Colostomy    EYE SURGERY      PROSTATE SURGERY  09/04/2014    Prostate Surgery     Social History     Tobacco Use    Smoking status: Never     Passive exposure: Past (Dad smoked)    Smokeless tobacco: Never   Substance Use Topics    Alcohol use: Not Currently     Comment: 1 glass a week     Family History   Problem Relation Name Age of Onset    Dementia Mother      Lung cancer Father Reji Gonsales     Cancer Father Reji Gonsales     Lymphoma Brother      Multiple myeloma Sibling      Alzheimer's disease Other Mult. fam members     Cancer Other Mult. fam members     Hypertension Brother Rubens Gonsales      Current Outpatient Medications on File Prior to Visit   Medication Sig Dispense Refill    ALPRAZolam (Niravam) 0.25 mg disintegrating tablet Take 1 tablet (0.25 mg) by mouth 3 times a day as needed for anxiety. 60 tablet 2    ezetimibe (Zetia) 10 mg tablet Take 1 tablet (10 mg) by mouth once daily.      losartan (Cozaar) 25 mg tablet Take 4 tablets (100 mg) by mouth once daily.      metoprolol succinate XL (Toprol-XL) 100 mg 24 hr tablet Take 1 tablet (100 mg) by mouth once daily. 30 tablet 11     No current facility-administered medications on file prior to visit.     Avtar is allergic to amoxicillin-pot clavulanate, codeine, and hydroxychloroquine.     Review of Systems    14 point ROS reviewed and discussed with the patient. Pertinent  positives/negatives discussed in the History of Present Illness (HPI).    FH:  Prostate CA: Yes   Bladder CA: No  Kidney CA: No  Stones: Yes - self and father     Social History  Occupation: retired   Tob: No  Etoh: Yes - socially   Cardiologist - retired, teaches at [employer]    Objective:     There is no height or weight on file to calculate BMI.   There were no vitals taken for this visit.     Physical Exam   1. Constitutional: NAD, Well-developed, Well-nourished  2. Respiratory: Unlabored breathing, no audible wheezes, no use of accessory muscles   3. Cardiovascular: No JVD, extremities perfused, no edema  4. Abdomen: Soft, non-tender, non-distended, no masses or hernia.  No CVA tenderness.    5. Skin: no visible lesions, plaque, rashes, jaundice  6. Neuro: Gait normal, no focal neurologic deficit  7. Psychiatric: Mood and affect normal and appropriate, alert and oriented      Assessment/Plan:   Avtar Gonsales presents with       1. Urinary frequency    2. OAB (overactive bladder)    3. H/O prostate cancer        Pt with h/o PCa s/p radical prostatectomy with bothersome OAB.  Failed conservative measures, failed multiple medications (anti-cholinergics and beta-agonists).  Interested in Axonics [facility].    I discussed the treatment pathways with PNE, Stage 1 and Stage 2.  We would plan to do PNE in office and have him keep diary for 1wk after to assess for a successful response (>50% improvement).  Leads would be removed in the office.  Plan to do stage 1/2 if successful, could do stage 1 with inconclusive.  Risks of pain, bleeding, infection discussed.  Risk that it may not work well and would need to be removed.      Has been working on conservative measures    Will get scheduled for PNE in April       No follow-ups on file.

## 2024-03-03 ASSESSMENT — ENCOUNTER SYMPTOMS
SWEATS: 0
PALPITATIONS: 0
BLURRED VISION: 0
PND: 0
HEADACHES: 0
NECK PAIN: 0
ORTHOPNEA: 0
SHORTNESS OF BREATH: 0
HYPERTENSION: 1

## 2024-03-04 ENCOUNTER — TELEPHONE (OUTPATIENT)
Dept: PRIMARY CARE | Facility: CLINIC | Age: 84
End: 2024-03-04

## 2024-03-04 ENCOUNTER — OFFICE VISIT (OUTPATIENT)
Dept: PRIMARY CARE | Facility: CLINIC | Age: 84
End: 2024-03-04
Payer: MEDICARE

## 2024-03-04 VITALS
SYSTOLIC BLOOD PRESSURE: 132 MMHG | HEART RATE: 67 BPM | BODY MASS INDEX: 26.47 KG/M2 | DIASTOLIC BLOOD PRESSURE: 74 MMHG | WEIGHT: 155.4 LBS

## 2024-03-04 DIAGNOSIS — F41.9 ANXIETY: Primary | ICD-10-CM

## 2024-03-04 DIAGNOSIS — M19.90 OSTEOARTHRITIS, UNSPECIFIED OSTEOARTHRITIS TYPE, UNSPECIFIED SITE: ICD-10-CM

## 2024-03-04 PROCEDURE — 3075F SYST BP GE 130 - 139MM HG: CPT | Performed by: FAMILY MEDICINE

## 2024-03-04 PROCEDURE — 3078F DIAST BP <80 MM HG: CPT | Performed by: FAMILY MEDICINE

## 2024-03-04 PROCEDURE — 1036F TOBACCO NON-USER: CPT | Performed by: FAMILY MEDICINE

## 2024-03-04 PROCEDURE — 1126F AMNT PAIN NOTED NONE PRSNT: CPT | Performed by: FAMILY MEDICINE

## 2024-03-04 PROCEDURE — 99214 OFFICE O/P EST MOD 30 MIN: CPT | Performed by: FAMILY MEDICINE

## 2024-03-04 PROCEDURE — 1159F MED LIST DOCD IN RCRD: CPT | Performed by: FAMILY MEDICINE

## 2024-03-04 PROCEDURE — 1160F RVW MEDS BY RX/DR IN RCRD: CPT | Performed by: FAMILY MEDICINE

## 2024-03-04 RX ORDER — ALPRAZOLAM 0.25 MG/1
0.25 TABLET, ORALLY DISINTEGRATING ORAL 3 TIMES DAILY PRN
Qty: 60 TABLET | Refills: 2 | Status: SHIPPED | OUTPATIENT
Start: 2024-03-04 | End: 2024-06-03 | Stop reason: SDUPTHER

## 2024-03-04 ASSESSMENT — ANXIETY QUESTIONNAIRES
2. NOT BEING ABLE TO STOP OR CONTROL WORRYING: NOT AT ALL
6. BECOMING EASILY ANNOYED OR IRRITABLE: NOT AT ALL
1. FEELING NERVOUS, ANXIOUS, OR ON EDGE: SEVERAL DAYS
IF YOU CHECKED OFF ANY PROBLEMS ON THIS QUESTIONNAIRE, HOW DIFFICULT HAVE THESE PROBLEMS MADE IT FOR YOU TO DO YOUR WORK, TAKE CARE OF THINGS AT HOME, OR GET ALONG WITH OTHER PEOPLE: NOT DIFFICULT AT ALL
GAD7 TOTAL SCORE: 1
3. WORRYING TOO MUCH ABOUT DIFFERENT THINGS: NOT AT ALL
5. BEING SO RESTLESS THAT IT IS HARD TO SIT STILL: NOT AT ALL
4. TROUBLE RELAXING: NOT AT ALL
7. FEELING AFRAID AS IF SOMETHING AWFUL MIGHT HAPPEN: NOT AT ALL

## 2024-03-04 ASSESSMENT — ENCOUNTER SYMPTOMS
ABDOMINAL PAIN: 0
EYE DISCHARGE: 0
FLANK PAIN: 0
PALPITATIONS: 0
PND: 0
SHORTNESS OF BREATH: 0
SWEATS: 0
CHEST TIGHTNESS: 0
HEMATURIA: 0
ORTHOPNEA: 0
DYSURIA: 0
FREQUENCY: 1
NERVOUS/ANXIOUS: 1
EYE ITCHING: 0
NECK PAIN: 0
BLURRED VISION: 0
HEADACHES: 0
APPETITE CHANGE: 0
ACTIVITY CHANGE: 0
EYE REDNESS: 0
HYPERTENSION: 1
ABDOMINAL DISTENTION: 0
EYE PAIN: 0

## 2024-03-04 NOTE — TELEPHONE ENCOUNTER
Please schedule patient for [facility] wellness visit in Sep 2024. Schedule with Dr. Sorto Please send this encounter to Dr. Sorto for lab orders once scheduled.     Thank you-  Jona Saldana CMA  3/4/2024  Practice Supervisor  [facility]

## 2024-03-04 NOTE — PROGRESS NOTES
Subjective   Patient ID: Avtar Gonsales is a 83 y.o. male who presents for Med Refill.    Hypertension  This is a chronic problem. The current episode started more than 1 year ago. The problem is unchanged. The problem is controlled. Associated symptoms include anxiety. Pertinent negatives include no blurred vision, chest pain, headaches, malaise/fatigue, neck pain, orthopnea, palpitations, peripheral edema, PND, shortness of breath or sweats. There are no associated agents to hypertension. Risk factors for coronary artery disease include dyslipidemia and stress. There are no compliance problems.       Patient reports that he has been going to see Dr. Vu as an outpatient a few years ago who had prescribed him benzodiazapine for sleep. He reports that his wife, who had a rare form of brain cancer, had passed away. He has been having more difficulty with sleep and has been taking this medication more regularly. Dr. Vu has since retired.     Patient recently was in the emergency room for elevated blood pressure.  He was tried on Ativan and reports that this medication did much better for him than his alprazolam for his anxiety.  He wishes to switch from alprazolam to Ativan, does not want to do this at this time because of how poorly he is feeling.     He has had some left lower quadrant discomfort.     Review of Systems   Constitutional:  Negative for activity change, appetite change and malaise/fatigue.   HENT:  Negative for congestion.    Eyes:  Negative for blurred vision, pain, discharge, redness and itching.   Respiratory:  Negative for chest tightness and shortness of breath.    Cardiovascular:  Negative for chest pain, palpitations, orthopnea and PND.   Gastrointestinal:  Negative for abdominal distention and abdominal pain.   Genitourinary:  Positive for frequency. Negative for dysuria, flank pain and hematuria.   Musculoskeletal:  Negative for neck pain.   Neurological:  Negative for headaches.    Psychiatric/Behavioral:  The patient is nervous/anxious.        Objective   /74 (BP Location: Right arm)   Pulse 67   Wt 70.5 kg (155 lb 6.4 oz)   BMI 26.47 kg/m²   BSA Body surface area is 1.79 meters squared.      Physical Exam  Constitutional:       General: He is not in acute distress.     Appearance: Normal appearance. He is normal weight. He is not ill-appearing or toxic-appearing.   HENT:      Head: Normocephalic.      Right Ear: Tympanic membrane, ear canal and external ear normal.      Left Ear: Tympanic membrane, ear canal and external ear normal.   Eyes:      General:         Right eye: No discharge.         Left eye: No discharge.      Extraocular Movements: Extraocular movements intact.      Conjunctiva/sclera: Conjunctivae normal.      Pupils: Pupils are equal, round, and reactive to light.   Cardiovascular:      Rate and Rhythm: Normal rate and regular rhythm.   Pulmonary:      Effort: Pulmonary effort is normal.      Breath sounds: Normal breath sounds.   Musculoskeletal:         General: Normal range of motion.   Skin:     General: Skin is warm and dry.   Neurological:      Mental Status: He is alert.   Psychiatric:         Mood and Affect: Mood normal.         Behavior: Behavior normal.         Thought Content: Thought content normal.         Judgment: Judgment normal.       Hospital Outpatient Visit on 11/22/2023   Component Date Value Ref Range Status    Ventricular Rate 11/22/2023 65  BPM Final    Atrial Rate 11/22/2023 65  BPM Final    OK Interval 11/22/2023 150  ms Final    QRS Duration 11/22/2023 94  ms Final    QT Interval 11/22/2023 386  ms Final    QTC Calculation(Bazett) 11/22/2023 401  ms Final    P Axis 11/22/2023 67  degrees Final    R Axis 11/22/2023 -25  degrees Final    T Axis 11/22/2023 39  degrees Final    QRS Count 11/22/2023 11  beats Final    Q Onset 11/22/2023 210  ms Final    P Onset 11/22/2023 135  ms Final    P Offset 11/22/2023 202  ms Final    T Offset  11/22/2023 403  ms Final    QTC Enzo 11/22/2023 396  ms Final   Office Visit on 05/22/2023   Component Date Value Ref Range Status    POC Rapid Strep 05/22/2023 Negative  Negative Final    SARS-CoV-2 Result 05/22/2023 NOT DETECTED  Not Detected Final    Comment: .  This assay is designed to detect the ORF1a/b and E genes of SARS-CoV-2 via   nucleic acid amplification. A Not Detected result does not preclude   2019-nCoV infection since the adequacy of sample collection and/or low viral   burden may result in presence of viral nucleic acids below the clinical   sensitivity of this test method.     Fact sheet for providers: https://www.fda.gov/media/592097/download   Fact sheet for patients: https://www.fda.gov/media/296243/download     This test has received FDA Emergency Use Authorization (EUA) and has been   verified for use by [facility] ([facility]).   This test is only authorized for the duration of time that circumstances   exist to justify the authorization of the emergency use of in vitro   diagnostic tests for the detection of SARS-CoV-2 virus and/or diagnosis of   COVID-19 infection under section 564(b)(1) of the Act, 21 U.S.C.   360bbb-3(b)(1), unless the authorization is terminated or revoked                            sooner.    [facility] is certified under CLIA-88 as   qualified to perform high complexity testing. Testing is performed in the   [facility] laboratories located at [address] [place] [address].    SARS-CoV-2 Result 05/23/2023 NOT DETECTED  Not Detected Final    Comment: .  This assay is designed to detect the ORF1a/b and E genes of SARS-CoV-2 via   nucleic acid amplification. A Not Detected result does not preclude   2019-nCoV infection since the adequacy of sample collection and/or low viral   burden may result in presence of viral nucleic acids below the clinical   sensitivity of this test method.     Fact sheet  for providers: https://www.fda.gov/media/860098/download   Fact sheet for patients: https://www.fda.gov/media/787609/download     This test has received FDA Emergency Use Authorization (EUA) and has been   verified for use by [facility] ([facility]).   This test is only authorized for the duration of time that circumstances   exist to justify the authorization of the emergency use of in vitro   diagnostic tests for the detection of SARS-CoV-2 virus and/or diagnosis of   COVID-19 infection under section 564(b)(1) of the Act, 21 U.S.C.   360bbb-3(b)(1), unless the authorization is terminated or revoked                            sooner.    [facility] is certified under CLIA-88 as   qualified to perform high complexity testing. Testing is performed in the   [facility] laboratories located at [address] [place] [address].   Office Visit on 03/21/2023   Component Date Value Ref Range Status    NONINV COLON CA DNA+OCC BLD SCRN S* 04/07/2023 Negative  Negative Final    Comment:   NEGATIVE TEST RESULT. A negative Cologuard result indicates a low likelihood that a colorectal cancer (CRC) or advanced adenoma (adenomatous polyps with more advanced pre-malignant features)  is present. The chance that a person with a negative Cologuard test has a colorectal cancer is less than 1 in 1500 (negative predictive value >99.9%) or has an  advanced adenoma is less than  5.3% (negative predictive value 94.7%). These data are based on a prospective cross-sectional study of 10,000 individuals at average risk for colorectal cancer who were screened with both Cologuard and colonoscopy. (Nichole Singh al, N Engl J Med 2014;370(14):4702-9760) The normal value (reference range) for this assay is negative.    COLOGUARD RE-SCREENING RECOMMENDATION: Periodic colorectal cancer screening is an important part of preventive healthcare for asymptomatic individuals at average risk  for colorectal cancer.  Following a negative Cologuard result, the American Cancer Society and U.S.                            Multi-Society Task Force screening guidelines recommend a Cologuard re-screening interval of 3 years.   References: American Cancer Society Guideline for Colorectal Cancer Screening: https://www.cancer.org/cancer/colon-rectal-cancer/xzgdeljza-qyhsoszey-nyrfqvk/acs-recommendations.html.; Jameel DK, Ailyn MARION, Ignacia WHALENK, Colorectal Cancer Screening: Recommendations for Physicians and Patients from the U.S. Multi-Society Task Force on Colorectal Cancer Screening , Am J Gastroenterology 2017; 112:8411-5343.    TEST DESCRIPTION: Composite algorithmic analysis of stool DNA-biomarkers with hemoglobin immunoassay.   Quantitative values of individual biomarkers are not reportable and are not associated with individual biomarker result reference ranges. Cologuard is intended for colorectal cancer screening of adults of either sex, 45 years or older, who are at average-risk for colorectal cancer (CRC). Cologuard has been approved for use by the U.S. FDA. The performance of Cologuard was                            established in a cross sectional study of average-risk adults aged 50-84. Cologuard performance in patients ages 45 to 49 years was estimated by sub-group analysis of near-age groups. Colonoscopies performed for a positive result may find as the most clinically significant lesion: colorectal cancer [4.0%], advanced adenoma (including sessile serrated polyps greater than or equal to 1cm diameter) [20%] or non- advanced adenoma [31%]; or no colorectal neoplasia [45%]. These estimates are derived from a prospective cross-sectional screening study of 10,000 individuals at average risk for colorectal cancer who were screened with both Cologuard and colonoscopy. (Nichole Singh al, N Engl J Med 2014;370(14):4595-7757.) Cologuard may produce a false negative or false positive result (no colorectal  cancer or precancerous polyp present at colonoscopy follow up). A negative Cologuard test result does not guarantee the absence of CRC or advanced adenoma (pre-cancer). The current Cologuard                            screening interval is every 3 years. (American Cancer Society and U.S. Multi-Society Task Force). Cologuard performance data in a 10,000 patient pivotal study using colonoscopy as the reference method can be accessed at the following location: [email or web address].[email or web address]/results. Additional description of the Cologuard test process, warnings and precautions can be found at [email or web address].       Current Outpatient Medications on File Prior to Visit   Medication Sig Dispense Refill    ezetimibe (Zetia) 10 mg tablet Take 1 tablet (10 mg) by mouth once daily.      losartan (Cozaar) 25 mg tablet Take 4 tablets (100 mg) by mouth once daily.      metoprolol succinate XL (Toprol-XL) 100 mg 24 hr tablet Take 1 tablet (100 mg) by mouth once daily. 30 tablet 11    [DISCONTINUED] ALPRAZolam (Niravam) 0.25 mg disintegrating tablet Take 1 tablet (0.25 mg) by mouth 3 times a day as needed for anxiety. 60 tablet 2     No current facility-administered medications on file prior to visit.     No images are attached to the encounter.      OARRS:  Angie Sorto DO on 3/4/2024  7:19 AM  I have personally reviewed the OARRS report for Avtar Gonsales. I have considered the risks of abuse, dependence, addiction and diversion    Is the patient prescribed a combination of a benzodiazepine and opioid?  No    Last Urine Drug Screen / ordered today: No  No results found for this or any previous visit (from the past 8760 hour(s)).    Results are as expected.     Controlled Substance Agreement:  Date of the Last Agreement: 03/04/2024  Reviewed Controlled Substance Agreement including but not limited to the benefits, risks, and alternatives to treatment with a Controlled Substance medication(s).    Benzodiazepines:  What is the patient's  goal of therapy? Less anxiety  Is this being achieved with current treatment? yes    MAXIM-7:  Over the last 2 weeks, how often have you been bothered by any of the following problems?  Feeling nervous, anxious, or on edge: 1  Not being able to stop or control worryin  Worrying too much about different things: 0  Trouble relaxin  Being so restless that it is hard to sit still: 0  Becoming easily annoyed or irritable: 0  Feeling afraid as if something awful might happen: 0  MAXIM-7 Total Score: 1        Activities of Daily Living:   Is your overall impression that this patient is benefiting (symptom reduction outweighs side effects) from benzodiazepine therapy? Yes     1. Physical Functioning: Same  2. Family Relationship: Same  3. Social Relationship: Same  4. Mood: Same  5. Sleep Patterns: Same  6. Overall Function: Same      Assessment/Plan   Problem List Items Addressed This Visit       Anxiety - Primary    Relevant Medications    ALPRAZolam (Niravam) 0.25 mg disintegrating tablet     Other Visit Diagnoses       Osteoarthritis, unspecified osteoarthritis type, unspecified site        Relevant Orders    ISADORA with Reflex to JORGE    Citrulline Antibody, IgG    C-Reactive Protein    Rheumatoid Factor    Sedimentation Rate    Uric Acid        1. Patient to come in one month to discuss possible switch from lorazepam to ativan  2.  Patient has signed his benzodiazepine contract 2024  3.  Patient's urine drug screen performed 2023  4.  Patient to call if questions or concerns

## 2024-03-07 DIAGNOSIS — E53.8 VITAMIN B12 DEFICIENCY: ICD-10-CM

## 2024-03-07 DIAGNOSIS — Z12.5 SCREENING FOR PROSTATE CANCER: ICD-10-CM

## 2024-03-07 DIAGNOSIS — I10 PRIMARY HYPERTENSION: ICD-10-CM

## 2024-03-07 DIAGNOSIS — E78.5 HYPERLIPIDEMIA, UNSPECIFIED HYPERLIPIDEMIA TYPE: ICD-10-CM

## 2024-03-12 DIAGNOSIS — R76.8 POSITIVE ANA (ANTINUCLEAR ANTIBODY): ICD-10-CM

## 2024-04-05 ENCOUNTER — APPOINTMENT (OUTPATIENT)
Dept: UROLOGY | Facility: HOSPITAL | Age: 84
End: 2024-04-05
Payer: MEDICARE

## 2024-06-03 ASSESSMENT — ENCOUNTER SYMPTOMS
HEMATURIA: 0
ABDOMINAL PAIN: 0
CHEST TIGHTNESS: 0
DYSURIA: 0
EYE PAIN: 0
SHORTNESS OF BREATH: 0
APPETITE CHANGE: 0
NERVOUS/ANXIOUS: 1
EYE REDNESS: 0
ABDOMINAL DISTENTION: 0
ORTHOPNEA: 0
PND: 0
ACTIVITY CHANGE: 0
HYPERTENSION: 1
NECK PAIN: 0
PALPITATIONS: 0
FLANK PAIN: 0
BLURRED VISION: 0
EYE ITCHING: 0
FREQUENCY: 1
SWEATS: 0
HEADACHES: 0
EYE DISCHARGE: 0

## 2024-06-03 NOTE — PROGRESS NOTES
Subjective   Patient ID: Avtar Gonsales is a 83 y.o. male who presents for Med Refill.    Hypertension  This is a chronic problem. The current episode started more than 1 year ago. The problem is unchanged. The problem is controlled. Associated symptoms include anxiety. Pertinent negatives include no blurred vision, chest pain, headaches, malaise/fatigue, neck pain, orthopnea, palpitations, peripheral edema, PND, shortness of breath or sweats. There are no associated agents to hypertension. Risk factors for coronary artery disease include dyslipidemia and stress. There are no compliance problems.       Patient reports that he has been going to see Dr. Vu as an outpatient a few years ago who had prescribed him benzodiazapine for sleep. He reports that his wife, who had a rare form of brain cancer, had passed away. He has been having more difficulty with sleep and has been taking this medication more regularly. Dr. Vu has since retired.     Patient recently was in the emergency room for elevated blood pressure.  He was tried on Ativan and reports that this medication did much better for him than his alprazolam for his anxiety.  He wishes to switch from alprazolam to Ativan, does not want to do this at this time because of how poorly he is feeling.     He has had some left lower quadrant discomfort.     Review of Systems   Constitutional:  Negative for activity change, appetite change and malaise/fatigue.   HENT:  Negative for congestion.    Eyes:  Negative for blurred vision, pain, discharge, redness and itching.   Respiratory:  Negative for chest tightness and shortness of breath.    Cardiovascular:  Negative for chest pain, palpitations, orthopnea and PND.   Gastrointestinal:  Negative for abdominal distention and abdominal pain.   Genitourinary:  Positive for frequency. Negative for dysuria, flank pain and hematuria.   Musculoskeletal:  Negative for neck pain.   Neurological:  Negative for headaches.    Psychiatric/Behavioral:  The patient is nervous/anxious.        Objective   /68 (BP Location: Left arm)   Pulse 70   Wt 69.8 kg (153 lb 12.8 oz)   BMI 26.19 kg/m²   BSA Body surface area is 1.78 meters squared.      Physical Exam  Constitutional:       General: He is not in acute distress.     Appearance: Normal appearance. He is normal weight. He is not ill-appearing or toxic-appearing.   HENT:      Head: Normocephalic.      Right Ear: Tympanic membrane, ear canal and external ear normal.      Left Ear: Tympanic membrane, ear canal and external ear normal.   Eyes:      General:         Right eye: No discharge.         Left eye: No discharge.      Extraocular Movements: Extraocular movements intact.      Conjunctiva/sclera: Conjunctivae normal.      Pupils: Pupils are equal, round, and reactive to light.   Cardiovascular:      Rate and Rhythm: Normal rate and regular rhythm.   Pulmonary:      Effort: Pulmonary effort is normal.      Breath sounds: Normal breath sounds.   Musculoskeletal:         General: Normal range of motion.   Skin:     General: Skin is warm and dry.   Neurological:      Mental Status: He is alert.   Psychiatric:         Mood and Affect: Mood normal.         Behavior: Behavior normal.         Thought Content: Thought content normal.         Judgment: Judgment normal.       Hospital Outpatient Visit on 11/22/2023   Component Date Value Ref Range Status    Ventricular Rate 11/22/2023 65  BPM Final    Atrial Rate 11/22/2023 65  BPM Final    MD Interval 11/22/2023 150  ms Final    QRS Duration 11/22/2023 94  ms Final    QT Interval 11/22/2023 386  ms Final    QTC Calculation(Bazett) 11/22/2023 401  ms Final    P Axis 11/22/2023 67  degrees Final    R Axis 11/22/2023 -25  degrees Final    T Axis 11/22/2023 39  degrees Final    QRS Count 11/22/2023 11  beats Final    Q Onset 11/22/2023 210  ms Final    P Onset 11/22/2023 135  ms Final    P Offset 11/22/2023 202  ms Final    T Offset  11/22/2023 403  ms Final    QTC Fredericia 11/22/2023 396  ms Final     Current Outpatient Medications on File Prior to Visit   Medication Sig Dispense Refill    diclofenac sodium (Voltaren) 1 % gel Apply 4.5 inches (4 g) topically 4 times a day as needed.      ezetimibe (Zetia) 10 mg tablet Take 1 tablet (10 mg) by mouth once daily.      ketoconazole (NIZOral) 2 % cream Apply to affected area in groin twice daily for 2 weeks as needed      losartan (Cozaar) 25 mg tablet Take 4 tablets (100 mg) by mouth once daily.      metoprolol succinate XL (Toprol-XL) 100 mg 24 hr tablet Take 1 tablet (100 mg) by mouth once daily. 30 tablet 11    mometasone (Elocon) 0.1 % lotion Mometasone Furoate 0.1 % External Solution Quantity: 60 Refills: 0 Ordered: 22-Mar-2022 DO Start : 22-Mar-2022 Complete      rosuvastatin (Crestor) 20 mg tablet Take 1 pill by mouth weekly every Sunday      [DISCONTINUED] ALPRAZolam (Niravam) 0.25 mg disintegrating tablet Take 1 tablet (0.25 mg) by mouth 3 times a day as needed for anxiety. 60 tablet 2     No current facility-administered medications on file prior to visit.     No images are attached to the encounter.      [place]AR[place]:  Angie Sorto DO on 6/4/2024  7:07 AM  I have personally reviewed the OARRS report for Avtar Gonsales. I have considered the risks of abuse, dependence, addiction and diversion    Is the patient prescribed a combination of a benzodiazepine and opioid?  No    Last Urine Drug Screen / ordered today: Yes  No results found for this or any previous visit (from the past 8760 hour(s)).    Results are as expected.     Controlled Substance Agreement:  Date of the Last Agreement: 03/04/2024  Reviewed Controlled Substance Agreement including but not limited to the benefits, risks, and alternatives to treatment with a Controlled Substance medication(s).    Benzodiazepines:  What is the patient's goal of therapy? Less anxiety  Is this being achieved with current treatment?  yes    MAXIM-7:  Over the last 2 weeks, how often have you been bothered by any of the following problems?  Feeling nervous, anxious, or on edge: 0  Not being able to stop or control worryin  Worrying too much about different things: 0  Trouble relaxin  Being so restless that it is hard to sit still: 0  Becoming easily annoyed or irritable: 0  Feeling afraid as if something awful might happen: 0  MAXIM-7 Total Score: 1          Activities of Daily Living:   Is your overall impression that this patient is benefiting (symptom reduction outweighs side effects) from benzodiazepine therapy? Yes     1. Physical Functioning: Same  2. Family Relationship: Same  3. Social Relationship: Same  4. Mood: Same  5. Sleep Patterns: Same  6. Overall Function: Same      Assessment/Plan   Problem List Items Addressed This Visit       Anxiety    Relevant Medications    ALPRAZolam (Niravam) 0.25 mg disintegrating tablet    Other Relevant Orders    Drug Screen, Urine With Reflex to Confirmation    Right knee pain - Primary    Relevant Orders    Drug Screen, Urine With Reflex to Confirmation    MR knee right wo IV contrast     1. Patient to come in one month to discuss possible switch from lorazepam to ativan  2.  Patient has signed his benzodiazepine contract 2024  3.  Patient to have urine drug screen from 2023  4.  Patient to call if questions or concerns

## 2024-06-04 ENCOUNTER — OFFICE VISIT (OUTPATIENT)
Dept: PRIMARY CARE | Facility: CLINIC | Age: 84
End: 2024-06-04
Payer: MEDICARE

## 2024-06-04 VITALS
BODY MASS INDEX: 26.19 KG/M2 | HEART RATE: 70 BPM | DIASTOLIC BLOOD PRESSURE: 68 MMHG | WEIGHT: 153.8 LBS | SYSTOLIC BLOOD PRESSURE: 122 MMHG

## 2024-06-04 DIAGNOSIS — M25.561 ACUTE PAIN OF RIGHT KNEE: Primary | ICD-10-CM

## 2024-06-04 DIAGNOSIS — M25.561 CHRONIC PAIN OF RIGHT KNEE: ICD-10-CM

## 2024-06-04 DIAGNOSIS — F41.9 ANXIETY: ICD-10-CM

## 2024-06-04 DIAGNOSIS — G89.29 CHRONIC PAIN OF RIGHT KNEE: ICD-10-CM

## 2024-06-04 PROCEDURE — 99214 OFFICE O/P EST MOD 30 MIN: CPT | Performed by: FAMILY MEDICINE

## 2024-06-04 PROCEDURE — 1159F MED LIST DOCD IN RCRD: CPT | Performed by: FAMILY MEDICINE

## 2024-06-04 PROCEDURE — 3074F SYST BP LT 130 MM HG: CPT | Performed by: FAMILY MEDICINE

## 2024-06-04 PROCEDURE — 1036F TOBACCO NON-USER: CPT | Performed by: FAMILY MEDICINE

## 2024-06-04 PROCEDURE — 3078F DIAST BP <80 MM HG: CPT | Performed by: FAMILY MEDICINE

## 2024-06-04 RX ORDER — KETOCONAZOLE 20 MG/G
CREAM TOPICAL
COMMUNITY
Start: 2024-05-23

## 2024-06-04 RX ORDER — MOMETASONE FUROATE 1 MG/ML
SOLUTION TOPICAL
COMMUNITY
Start: 2022-03-22

## 2024-06-04 RX ORDER — ROSUVASTATIN CALCIUM 20 MG/1
TABLET, COATED ORAL
COMMUNITY
Start: 2024-04-26

## 2024-06-04 RX ORDER — ALPRAZOLAM 0.25 MG/1
0.25 TABLET, ORALLY DISINTEGRATING ORAL 3 TIMES DAILY PRN
Qty: 60 TABLET | Refills: 2 | Status: SHIPPED | OUTPATIENT
Start: 2024-06-04

## 2024-06-04 RX ORDER — DICLOFENAC SODIUM 10 MG/G
4 GEL TOPICAL 4 TIMES DAILY PRN
COMMUNITY
Start: 2020-07-28

## 2024-06-04 ASSESSMENT — ANXIETY QUESTIONNAIRES
IF YOU CHECKED OFF ANY PROBLEMS ON THIS QUESTIONNAIRE, HOW DIFFICULT HAVE THESE PROBLEMS MADE IT FOR YOU TO DO YOUR WORK, TAKE CARE OF THINGS AT HOME, OR GET ALONG WITH OTHER PEOPLE: NOT DIFFICULT AT ALL
4. TROUBLE RELAXING: SEVERAL DAYS
6. BECOMING EASILY ANNOYED OR IRRITABLE: NOT AT ALL
2. NOT BEING ABLE TO STOP OR CONTROL WORRYING: NOT AT ALL
5. BEING SO RESTLESS THAT IT IS HARD TO SIT STILL: NOT AT ALL
1. FEELING NERVOUS, ANXIOUS, OR ON EDGE: NOT AT ALL
3. WORRYING TOO MUCH ABOUT DIFFERENT THINGS: NOT AT ALL
GAD7 TOTAL SCORE: 1
7. FEELING AFRAID AS IF SOMETHING AWFUL MIGHT HAPPEN: NOT AT ALL

## 2024-06-11 ENCOUNTER — HOSPITAL ENCOUNTER (OUTPATIENT)
Dept: RADIOLOGY | Facility: CLINIC | Age: 84
Discharge: HOME | End: 2024-06-11
Payer: MEDICARE

## 2024-06-11 ENCOUNTER — TELEPHONE (OUTPATIENT)
Dept: PRIMARY CARE | Facility: CLINIC | Age: 84
End: 2024-06-11
Payer: MEDICARE

## 2024-06-11 DIAGNOSIS — M25.561 ACUTE PAIN OF RIGHT KNEE: ICD-10-CM

## 2024-06-11 PROCEDURE — 73721 MRI JNT OF LWR EXTRE W/O DYE: CPT | Mod: RT

## 2024-06-11 PROCEDURE — 73721 MRI JNT OF LWR EXTRE W/O DYE: CPT | Mod: RIGHT SIDE | Performed by: RADIOLOGY

## 2024-06-11 NOTE — TELEPHONE ENCOUNTER
Judy from [facility] needed a referral for patient Mri of the right knee. Faxed 6/11 at 9:45 am to [facility] [phone]

## 2024-06-14 ENCOUNTER — LAB (OUTPATIENT)
Dept: LAB | Facility: LAB | Age: 84
End: 2024-06-14
Payer: MEDICARE

## 2024-06-14 DIAGNOSIS — F41.9 ANXIETY: ICD-10-CM

## 2024-06-14 DIAGNOSIS — M25.561 CHRONIC PAIN OF RIGHT KNEE: ICD-10-CM

## 2024-06-14 DIAGNOSIS — G89.29 CHRONIC PAIN OF RIGHT KNEE: ICD-10-CM

## 2024-06-14 PROCEDURE — 80346 BENZODIAZEPINES1-12: CPT

## 2024-06-14 PROCEDURE — 80307 DRUG TEST PRSMV CHEM ANLYZR: CPT

## 2024-06-15 LAB
AMPHETAMINES UR QL SCN: ABNORMAL
BARBITURATES UR QL SCN: ABNORMAL
BENZODIAZ UR QL SCN: ABNORMAL
BZE UR QL SCN: ABNORMAL
CANNABINOIDS UR QL SCN: ABNORMAL
FENTANYL+NORFENTANYL UR QL SCN: ABNORMAL
METHADONE UR QL SCN: ABNORMAL
OPIATES UR QL SCN: ABNORMAL
OXYCODONE+OXYMORPHONE UR QL SCN: ABNORMAL
PCP UR QL SCN: ABNORMAL

## 2024-06-18 ENCOUNTER — APPOINTMENT (OUTPATIENT)
Dept: PRIMARY CARE | Facility: CLINIC | Age: 84
End: 2024-06-18
Payer: MEDICARE

## 2024-06-18 VITALS
SYSTOLIC BLOOD PRESSURE: 142 MMHG | HEART RATE: 68 BPM | BODY MASS INDEX: 26.47 KG/M2 | WEIGHT: 155.4 LBS | DIASTOLIC BLOOD PRESSURE: 78 MMHG

## 2024-06-18 DIAGNOSIS — H61.23 BILATERAL IMPACTED CERUMEN: Primary | ICD-10-CM

## 2024-06-18 DIAGNOSIS — G47.00 INSOMNIA, UNSPECIFIED TYPE: ICD-10-CM

## 2024-06-18 PROBLEM — R19.7 DIARRHEA: Status: RESOLVED | Noted: 2023-03-06 | Resolved: 2024-06-18

## 2024-06-18 PROBLEM — K92.1 HEMATOCHEZIA: Status: ACTIVE | Noted: 2024-06-18

## 2024-06-18 PROBLEM — H00.019 HORDEOLUM EXTERNUM: Status: ACTIVE | Noted: 2024-06-18

## 2024-06-18 PROBLEM — K90.89 BILE SALT-INDUCED DIARRHEA (HHS-HCC): Status: RESOLVED | Noted: 2023-03-06 | Resolved: 2024-06-18

## 2024-06-18 PROBLEM — J02.9 PHARYNGITIS: Status: RESOLVED | Noted: 2023-05-22 | Resolved: 2024-06-18

## 2024-06-18 PROCEDURE — 1159F MED LIST DOCD IN RCRD: CPT | Performed by: FAMILY MEDICINE

## 2024-06-18 PROCEDURE — 3078F DIAST BP <80 MM HG: CPT | Performed by: FAMILY MEDICINE

## 2024-06-18 PROCEDURE — 1036F TOBACCO NON-USER: CPT | Performed by: FAMILY MEDICINE

## 2024-06-18 PROCEDURE — 3077F SYST BP >= 140 MM HG: CPT | Performed by: FAMILY MEDICINE

## 2024-06-18 PROCEDURE — 99214 OFFICE O/P EST MOD 30 MIN: CPT | Performed by: FAMILY MEDICINE

## 2024-06-18 PROCEDURE — 1160F RVW MEDS BY RX/DR IN RCRD: CPT | Performed by: FAMILY MEDICINE

## 2024-06-18 RX ORDER — TRAZODONE HYDROCHLORIDE 50 MG/1
25 TABLET ORAL NIGHTLY PRN
Qty: 30 TABLET | Refills: 0 | Status: SHIPPED | OUTPATIENT
Start: 2024-06-18 | End: 2025-06-18

## 2024-06-18 RX ORDER — PREDNISONE 5 MG/1
TABLET ORAL
COMMUNITY
Start: 2024-06-12

## 2024-06-18 ASSESSMENT — ENCOUNTER SYMPTOMS
CHILLS: 0
COUGH: 0
CONFUSION: 0
WHEEZING: 0
FACIAL SWELLING: 0
APPETITE CHANGE: 0
PALPITATIONS: 0
FEVER: 0
CONSTIPATION: 0
ACTIVITY CHANGE: 0
EYE DISCHARGE: 0
DIARRHEA: 0
COLOR CHANGE: 0
ARTHRALGIAS: 0

## 2024-06-18 NOTE — PROGRESS NOTES
Subjective   Patient ID: Avtar Gonsales is a 83 y.o. male who presents for Right ear irrigation .    HPI   Patient comes in to follow-up on his right ear.  He reports that he is still having difficulty with hearing out of this right side and is going to be doing a great deal of traveling and would like this ear irrigated.  He denies any nausea vomiting diarrhea constipation denies any chest pain shortness of breath syncopal episodes or palpitations.    Review of Systems   Constitutional:  Negative for activity change, appetite change, chills and fever.   HENT:  Negative for congestion, ear pain and facial swelling.    Eyes:  Negative for discharge.   Respiratory:  Negative for cough and wheezing.    Cardiovascular:  Negative for chest pain, palpitations and leg swelling.   Gastrointestinal:  Negative for constipation and diarrhea.   Musculoskeletal:  Negative for arthralgias.   Skin:  Negative for color change and pallor.   Neurological:  Negative for syncope.   Psychiatric/Behavioral:  Negative for confusion.        Objective   /78 (BP Location: Left arm)   Pulse 68   Wt 70.5 kg (155 lb 6.4 oz)   BMI 26.47 kg/m²   BSA Body surface area is 1.79 meters squared.      Physical Exam  Constitutional:       General: He is not in acute distress.     Appearance: Normal appearance. He is not toxic-appearing.   HENT:      Head: Normocephalic.      Right Ear: There is impacted cerumen.      Left Ear: There is impacted cerumen.      Ears:      Comments: Right ear cerumen impaction appreciated a cerumen spoon was utilized to attempt to remove wax this was unsuccessful and ear irrigation was performed until visualized clear bilaterally to tympanic membranes with otoscope  Eyes:      Conjunctiva/sclera: Conjunctivae normal.      Pupils: Pupils are equal, round, and reactive to light.   Musculoskeletal:         General: No swelling.   Neurological:      General: No focal deficit present.      Mental Status: He is alert and  oriented to person, place, and time. Mental status is at baseline.      Gait: Gait normal.   Psychiatric:         Mood and Affect: Mood normal.         Behavior: Behavior normal.         Thought Content: Thought content normal.         Judgment: Judgment normal.       Lab on 06/14/2024   Component Date Value Ref Range Status    Amphetamine Screen, Urine 06/14/2024 Presumptive Negative  Presumptive Negative Final    CUTOFF LEVEL: 500 NG/ML   Cross-reactivity has been reported with high concentrations   of the following drugs: buproprion, chloroquine, chlorpromazine,   ephedrine, mephentermine, fenfluramine, phentermine,   phenylpropanolamine, pseudoephedrine, and propranolol.    Barbiturate Screen, Urine 06/14/2024 Presumptive Negative  Presumptive Negative Final    CUTOFF LEVEL: 200 NG/ML    Benzodiazepines Screen, Urine 06/14/2024 Presumptive Positive (A)  Presumptive Negative Final    CUTOFF LEVEL: 200 NG/ML    Cannabinoid Screen, Urine 06/14/2024 Presumptive Negative  Presumptive Negative Final    CUTOFF LEVEL: 50 NG/ML    Cocaine Metabolite Screen, Urine 06/14/2024 Presumptive Negative  Presumptive Negative Final    CUTOFF LEVEL: 150 NG/ML    Fentanyl Screen, Urine 06/14/2024 Presumptive Negative  Presumptive Negative Final    CUTOFF LEVEL: 5 NG/ML    Opiate Screen, Urine 06/14/2024 Presumptive Negative  Presumptive Negative Final    CUTOFF LEVEL: 300 NG/ML  The opiate screen does not detect fentanyl, meperidine, or   tramadol. Oxycodone is not consistently detected (refer to  Oxycodone Screen, Urine result).    Oxycodone Screen, Urine 06/14/2024 Presumptive Negative  Presumptive Negative Final    CUTOFF LEVEL: 100 NG/ML  This test will accurately detect both oxycodone and oxymorphone.    PCP Screen, Urine 06/14/2024 Presumptive Negative  Presumptive Negative Final    CUTOFF LEVEL:  25 NG/ML  Cross-reactivity has been reported with dextromethorphan.    Methadone Screen, Urine 06/14/2024 Presumptive Negative   Presumptive Negative Final    CUTOFF LEVEL: 150 NG/ML  The metabolite L-alpha-acetylmethadol (LAAM) is not  detected by this method in concentrations that would  be found in the urine of patients on LAAM therapy.   Hospital Outpatient Visit on 11/22/2023   Component Date Value Ref Range Status    Ventricular Rate 11/22/2023 65  BPM Final    Atrial Rate 11/22/2023 65  BPM Final    [place] Interval 11/22/2023 150  ms Final    QRS Duration 11/22/2023 94  ms Final    QT Interval 11/22/2023 386  ms Final    QTC Calculation(Bazett) 11/22/2023 401  ms Final    P Axis 11/22/2023 67  degrees Final    R Axis 11/22/2023 -25  degrees Final    T Axis 11/22/2023 39  degrees Final    QRS Count 11/22/2023 11  beats Final    Q Onset 11/22/2023 210  ms Final    P Onset 11/22/2023 135  ms Final    P Offset 11/22/2023 202  ms Final    T Offset 11/22/2023 403  ms Final    QTC Fredericia 11/22/2023 396  ms Final     Current Outpatient Medications on File Prior to Visit   Medication Sig Dispense Refill    ALPRAZolam (Niravam) 0.25 mg disintegrating tablet Take 1 tablet (0.25 mg) by mouth 3 times a day as needed for anxiety. 60 tablet 2    diclofenac sodium (Voltaren) 1 % gel Apply 4.5 inches (4 g) topically 4 times a day as needed.      ezetimibe (Zetia) 10 mg tablet Take 1 tablet (10 mg) by mouth once daily.      ketoconazole (NIZOral) 2 % cream Apply to affected area in groin twice daily for 2 weeks as needed      losartan (Cozaar) 25 mg tablet Take 4 tablets (100 mg) by mouth once daily.      metoprolol succinate XL (Toprol-XL) 100 mg 24 hr tablet Take 1 tablet (100 mg) by mouth once daily. 30 tablet 11    mometasone (Elocon) 0.1 % lotion Mometasone Furoate 0.1 % External Solution Quantity: 60 Refills: 0 Ordered: 22-Mar-2022 DO Start : 22-Mar-2022 Complete      predniSONE (Deltasone) 5 mg tablet Prednisone 5 mg tab: take 4 tabs daily x 5 days, 3 tabs daily x 5 days, 2 tabs daily x 5 days, 1 tab daily x 5 days, stop      rosuvastatin  (Crestor) 20 mg tablet Take 1 pill by mouth weekly every Sunday       No current facility-administered medications on file prior to visit.     No images are attached to the encounter.        Assessment/Plan   Diagnoses and all orders for this visit:  Bilateral impacted cerumen  Insomnia, unspecified type  1.  Patient to keep ears clean and dry  2.  Patient to call for questions or concerns

## 2024-06-20 LAB
1OH-MIDAZOLAM UR CFM-MCNC: <25 NG/ML
7AMINOCLONAZEPAM UR CFM-MCNC: <25 NG/ML
A-OH ALPRAZ UR CFM-MCNC: 68 NG/ML
ALPRAZ UR CFM-MCNC: 51 NG/ML
CHLORDIAZEP UR CFM-MCNC: <25 NG/ML
CLONAZEPAM UR CFM-MCNC: <25 NG/ML
DIAZEPAM UR CFM-MCNC: <25 NG/ML
LORAZEPAM UR CFM-MCNC: <25 NG/ML
MIDAZOLAM UR CFM-MCNC: <25 NG/ML
NORDIAZEPAM UR CFM-MCNC: <25 NG/ML
OXAZEPAM UR CFM-MCNC: <25 NG/ML
TEMAZEPAM UR CFM-MCNC: <25 NG/ML

## 2024-09-03 ASSESSMENT — ENCOUNTER SYMPTOMS
CHEST TIGHTNESS: 0
EYE PAIN: 0
HEADACHES: 0
FLANK PAIN: 0
EYE ITCHING: 0
HEMATURIA: 0
NECK PAIN: 0
DYSURIA: 0
SHORTNESS OF BREATH: 0
HYPERTENSION: 1
SWEATS: 0
ACTIVITY CHANGE: 0
PALPITATIONS: 0
PND: 0
BLURRED VISION: 0
ORTHOPNEA: 0
NERVOUS/ANXIOUS: 1
ABDOMINAL DISTENTION: 0
ABDOMINAL PAIN: 0
FREQUENCY: 1
EYE REDNESS: 0
EYE DISCHARGE: 0
APPETITE CHANGE: 0

## 2024-09-03 NOTE — PROGRESS NOTES
Subjective   Patient ID: Avtar Gonsales is a 83 y.o. male who presents for Med Refill.    Hypertension  This is a chronic problem. The current episode started more than 1 year ago. The problem is unchanged. The problem is controlled. Associated symptoms include anxiety. Pertinent negatives include no blurred vision, chest pain, headaches, malaise/fatigue, neck pain, orthopnea, palpitations, peripheral edema, PND, shortness of breath or sweats. There are no associated agents to hypertension. Risk factors for coronary artery disease include dyslipidemia and stress. There are no compliance problems.       Patient reports that he has been going to see Dr. Vu as an outpatient a few years ago who had prescribed him benzodiazapine for sleep. He reports that his wife, who had a rare form of brain cancer, had passed away. He has been having more difficulty with sleep and has been taking this medication more regularly. Dr. Vu has since retired.     Patient recently was in the emergency room for elevated blood pressure.  He was tried on Ativan and reports that this medication did much better for him than his alprazolam for his anxiety.  He wishes to switch from alprazolam to Ativan, does not want to do this at this time because of how poorly he is feeling.     He has had some left lower quadrant discomfort.     Review of Systems   Constitutional:  Negative for activity change, appetite change and malaise/fatigue.   HENT:  Negative for congestion.    Eyes:  Negative for blurred vision, pain, discharge, redness and itching.   Respiratory:  Negative for chest tightness and shortness of breath.    Cardiovascular:  Negative for chest pain, palpitations, orthopnea and PND.   Gastrointestinal:  Negative for abdominal distention and abdominal pain.   Genitourinary:  Positive for frequency. Negative for dysuria, flank pain and hematuria.   Musculoskeletal:  Negative for neck pain.   Neurological:  Negative for headaches.    Psychiatric/Behavioral:  The patient is nervous/anxious.        Objective   /68 (BP Location: Right arm, Patient Position: Sitting)   Pulse 73   Wt 69.5 kg (153 lb 3.2 oz)   BMI 26.09 kg/m²   BSA Body surface area is 1.77 meters squared.      Physical Exam  Constitutional:       General: He is not in acute distress.     Appearance: Normal appearance. He is normal weight. He is not ill-appearing or toxic-appearing.   HENT:      Head: Normocephalic.      Right Ear: Tympanic membrane, ear canal and external ear normal.      Left Ear: Tympanic membrane, ear canal and external ear normal.   Eyes:      General:         Right eye: No discharge.         Left eye: No discharge.      Extraocular Movements: Extraocular movements intact.      Conjunctiva/sclera: Conjunctivae normal.      Pupils: Pupils are equal, round, and reactive to light.   Cardiovascular:      Rate and Rhythm: Normal rate and regular rhythm.   Pulmonary:      Effort: Pulmonary effort is normal.      Breath sounds: Normal breath sounds.   Musculoskeletal:         General: Normal range of motion.   Skin:     General: Skin is warm and dry.   Neurological:      Mental Status: He is alert.   Psychiatric:         Mood and Affect: Mood normal.         Behavior: Behavior normal.         Thought Content: Thought content normal.         Judgment: Judgment normal.       Lab on 06/14/2024   Component Date Value Ref Range Status    Amphetamine Screen, Urine 06/14/2024 Presumptive Negative  Presumptive Negative Final    CUTOFF LEVEL: 500 NG/ML   Cross-reactivity has been reported with high concentrations   of the following drugs: buproprion, chloroquine, chlorpromazine,   ephedrine, mephentermine, fenfluramine, phentermine,   phenylpropanolamine, pseudoephedrine, and propranolol.    Barbiturate Screen, Urine 06/14/2024 Presumptive Negative  Presumptive Negative Final    CUTOFF LEVEL: 200 NG/ML    Benzodiazepines Screen, Urine 06/14/2024 Presumptive Positive  (A)  Presumptive Negative Final    CUTOFF LEVEL: 200 NG/ML    Cannabinoid Screen, Urine 06/14/2024 Presumptive Negative  Presumptive Negative Final    CUTOFF LEVEL: 50 NG/ML    Cocaine Metabolite Screen, Urine 06/14/2024 Presumptive Negative  Presumptive Negative Final    CUTOFF LEVEL: 150 NG/ML    Fentanyl Screen, Urine 06/14/2024 Presumptive Negative  Presumptive Negative Final    CUTOFF LEVEL: 5 NG/ML    Opiate Screen, Urine 06/14/2024 Presumptive Negative  Presumptive Negative Final    CUTOFF LEVEL: 300 NG/ML  The opiate screen does not detect fentanyl, meperidine, or   tramadol. Oxycodone is not consistently detected (refer to  Oxycodone Screen, Urine result).    Oxycodone Screen, Urine 06/14/2024 Presumptive Negative  Presumptive Negative Final    CUTOFF LEVEL: 100 NG/ML  This test will accurately detect both oxycodone and oxymorphone.    PCP Screen, Urine 06/14/2024 Presumptive Negative  Presumptive Negative Final    CUTOFF LEVEL:  25 NG/ML  Cross-reactivity has been reported with dextromethorphan.    Methadone Screen, Urine 06/14/2024 Presumptive Negative  Presumptive Negative Final    CUTOFF LEVEL: 150 NG/ML  The metabolite L-alpha-acetylmethadol (LAAM) is not  detected by this method in concentrations that would  be found in the urine of patients on LAAM therapy.    Clonazepam 06/14/2024 <25  <25 ng/mL Final    7-Aminoclonazepam 06/14/2024 <25  <25 ng/mL Final    Alprazolam 06/14/2024 51 (H)  <25 ng/mL Final    Consistent with use of a drug containing alprazolam, such as Xanax.      Alpha-Hydroxyalprazolam 06/14/2024 68 (H)  <25 ng/mL Final    Alprazolam metabolite; consistent with use of a drug containing alprazolam, such as Xanax.    Midazolam 06/14/2024 <25  <25 ng/mL Final    Alpha-Hydroxymidazolam 06/14/2024 <25  <25 ng/mL Final    Chlordiazepoxide 06/14/2024 <25  <25 ng/mL Final    Diazepam 06/14/2024 <25  <25 ng/mL Final    Nordiazepam 06/14/2024 <25  <25 ng/mL Final    Temazepam 06/14/2024 <25  <25  ng/mL Final    Oxazepam 06/14/2024 <25  <25 ng/mL Final    Lorazepam 06/14/2024 <25  <25 ng/mL Final   Hospital Outpatient Visit on 11/22/2023   Component Date Value Ref Range Status    Ventricular Rate 11/22/2023 65  BPM Final    Atrial Rate 11/22/2023 65  BPM Final    VT Interval 11/22/2023 150  ms Final    QRS Duration 11/22/2023 94  ms Final    QT Interval 11/22/2023 386  ms Final    QTC Calculation(Bazett) 11/22/2023 401  ms Final    P Axis 11/22/2023 67  degrees Final    R Axis 11/22/2023 -25  degrees Final    T Axis 11/22/2023 39  degrees Final    QRS Count 11/22/2023 11  beats Final    Q Onset 11/22/2023 210  ms Final    P Onset 11/22/2023 135  ms Final    P Offset 11/22/2023 202  ms Final    T Offset 11/22/2023 403  ms Final    QTC Fredericia 11/22/2023 396  ms Final     Current Outpatient Medications on File Prior to Visit   Medication Sig Dispense Refill    ezetimibe (Zetia) 10 mg tablet Take 1 tablet (10 mg) by mouth once daily.      losartan (Cozaar) 25 mg tablet Take 4 tablets (100 mg) by mouth once daily.      metoprolol succinate XL (Toprol-XL) 100 mg 24 hr tablet Take 1 tablet (100 mg) by mouth once daily. 30 tablet 11    predniSONE (Deltasone) 5 mg tablet Prednisone 5 mg tab: take 4 tabs daily x 5 days, 3 tabs daily x 5 days, 2 tabs daily x 5 days, 1 tab daily x 5 days, stop      traZODone (Desyrel) 50 mg tablet Take 0.5 tablets (25 mg) by mouth as needed at bedtime for sleep. 30 tablet 0    [DISCONTINUED] ALPRAZolam (Niravam) 0.25 mg disintegrating tablet Take 1 tablet (0.25 mg) by mouth 3 times a day as needed for anxiety. 60 tablet 2    rosuvastatin (Crestor) 20 mg tablet Take 1 pill by mouth weekly every Sunday      [DISCONTINUED] diclofenac sodium (Voltaren) 1 % gel Apply 4.5 inches (4 g) topically 4 times a day as needed.      [DISCONTINUED] ketoconazole (NIZOral) 2 % cream Apply to affected area in groin twice daily for 2 weeks as needed      [DISCONTINUED] mometasone (Elocon) 0.1 % lotion  Mometasone Furoate 0.1 % External Solution Quantity: 60 Refills: 0 Ordered: 22-Mar-2022 DO Start : 22-Mar-2022 Complete       No current facility-administered medications on file prior to visit.     No images are attached to the encounter.      OAR[place]:  Angie Sorto, DO on 9/4/2024 11:20 AM  I have personally reviewed the OARRS report for Avtar Gonsales. I have considered the risks of abuse, dependence, addiction and diversion    Is the patient prescribed a combination of a benzodiazepine and opioid?  No    Last Urine Drug Screen / ordered today: No  Recent Results (from the past 8760 hour(s))   Benzodiazepine Confirmation, Urine    Collection Time: 06/14/24 11:58 AM   Result Value Ref Range    Clonazepam <25 <25 ng/mL    7-Aminoclonazepam <25 <25 ng/mL    Alprazolam 51 (H) <25 ng/mL    Alpha-Hydroxyalprazolam 68 (H) <25 ng/mL    Midazolam <25 <25 ng/mL    Alpha-Hydroxymidazolam <25 <25 ng/mL    Chlordiazepoxide <25 <25 ng/mL    Diazepam <25 <25 ng/mL    Nordiazepam <25 <25 ng/mL    Temazepam <25 <25 ng/mL    Oxazepam <25 <25 ng/mL    Lorazepam <25 <25 ng/mL   Drug Screen, Urine With Reflex to Confirmation    Collection Time: 06/14/24 11:58 AM   Result Value Ref Range    Amphetamine Screen, Urine Presumptive Negative Presumptive Negative    Barbiturate Screen, Urine Presumptive Negative Presumptive Negative    Benzodiazepines Screen, Urine Presumptive Positive (A) Presumptive Negative    Cannabinoid Screen, Urine Presumptive Negative Presumptive Negative    Cocaine Metabolite Screen, Urine Presumptive Negative Presumptive Negative    Fentanyl Screen, Urine Presumptive Negative Presumptive Negative    Opiate Screen, Urine Presumptive Negative Presumptive Negative    Oxycodone Screen, Urine Presumptive Negative Presumptive Negative    PCP Screen, Urine Presumptive Negative Presumptive Negative    Methadone Screen, Urine Presumptive Negative Presumptive Negative       Results are as expected.     Controlled Substance  Agreement:  Date of the Last Agreement: 2024  Reviewed Controlled Substance Agreement including but not limited to the benefits, risks, and alternatives to treatment with a Controlled Substance medication(s).    Benzodiazepines:  What is the patient's goal of therapy? Less anxiety  Is this being achieved with current treatment? yes    MAXIM-7:  Over the last 2 weeks, how often have you been bothered by any of the following problems?  Feeling nervous, anxious, or on edge: 0  Not being able to stop or control worryin  Worrying too much about different things: 0  Trouble relaxin  Being so restless that it is hard to sit still: 0  Becoming easily annoyed or irritable: 0  Feeling afraid as if something awful might happen: 0  MAXIM-7 Total Score: 1          Activities of Daily Living:   Is your overall impression that this patient is benefiting (symptom reduction outweighs side effects) from benzodiazepine therapy? Yes     1. Physical Functioning: Same  2. Family Relationship: Same  3. Social Relationship: Same  4. Mood: Same  5. Sleep Patterns: Same  6. Overall Function: Same      Assessment/Plan   Problem List Items Addressed This Visit       Anxiety - Primary    Relevant Medications    ALPRAZolam (Niravam) 0.25 mg disintegrating tablet     1. Patient to come in one month to discuss possible switch from lorazepam to ativan  2.  Patient has signed his benzodiazepine contract 2024  3.  Patient's urine drug screen performed 2023  4.  Patient to call if questions or concerns

## 2024-09-04 ENCOUNTER — APPOINTMENT (OUTPATIENT)
Dept: PRIMARY CARE | Facility: CLINIC | Age: 84
End: 2024-09-04
Payer: MEDICARE

## 2024-09-04 VITALS
BODY MASS INDEX: 26.09 KG/M2 | WEIGHT: 153.2 LBS | HEART RATE: 73 BPM | DIASTOLIC BLOOD PRESSURE: 68 MMHG | SYSTOLIC BLOOD PRESSURE: 130 MMHG

## 2024-09-04 DIAGNOSIS — F41.9 ANXIETY: Primary | ICD-10-CM

## 2024-09-04 DIAGNOSIS — R41.3 MEMORY CHANGE: ICD-10-CM

## 2024-09-04 PROCEDURE — 1159F MED LIST DOCD IN RCRD: CPT | Performed by: FAMILY MEDICINE

## 2024-09-04 PROCEDURE — 99214 OFFICE O/P EST MOD 30 MIN: CPT | Performed by: FAMILY MEDICINE

## 2024-09-04 PROCEDURE — 3075F SYST BP GE 130 - 139MM HG: CPT | Performed by: FAMILY MEDICINE

## 2024-09-04 PROCEDURE — 3078F DIAST BP <80 MM HG: CPT | Performed by: FAMILY MEDICINE

## 2024-09-04 PROCEDURE — 1036F TOBACCO NON-USER: CPT | Performed by: FAMILY MEDICINE

## 2024-09-04 RX ORDER — ALPRAZOLAM 0.25 MG/1
0.25 TABLET, ORALLY DISINTEGRATING ORAL 3 TIMES DAILY PRN
Qty: 60 TABLET | Refills: 0 | Status: SHIPPED | OUTPATIENT
Start: 2024-09-04

## 2024-09-04 ASSESSMENT — ANXIETY QUESTIONNAIRES
3. WORRYING TOO MUCH ABOUT DIFFERENT THINGS: NOT AT ALL
IF YOU CHECKED OFF ANY PROBLEMS ON THIS QUESTIONNAIRE, HOW DIFFICULT HAVE THESE PROBLEMS MADE IT FOR YOU TO DO YOUR WORK, TAKE CARE OF THINGS AT HOME, OR GET ALONG WITH OTHER PEOPLE: NOT DIFFICULT AT ALL
6. BECOMING EASILY ANNOYED OR IRRITABLE: NOT AT ALL
2. NOT BEING ABLE TO STOP OR CONTROL WORRYING: NOT AT ALL
1. FEELING NERVOUS, ANXIOUS, OR ON EDGE: NOT AT ALL
7. FEELING AFRAID AS IF SOMETHING AWFUL MIGHT HAPPEN: NOT AT ALL
4. TROUBLE RELAXING: SEVERAL DAYS
5. BEING SO RESTLESS THAT IT IS HARD TO SIT STILL: NOT AT ALL
GAD7 TOTAL SCORE: 1

## 2024-09-06 ENCOUNTER — APPOINTMENT (OUTPATIENT)
Dept: PRIMARY CARE | Facility: CLINIC | Age: 84
End: 2024-09-06
Payer: MEDICARE

## 2024-09-10 ENCOUNTER — TELEPHONE (OUTPATIENT)
Dept: PRIMARY CARE | Facility: CLINIC | Age: 84
End: 2024-09-10
Payer: MEDICARE

## 2024-09-10 NOTE — TELEPHONE ENCOUNTER
Pt called requesting a new prescription of Atarax pt stated he had spoken with you about this medication before at prior appointments inquiring if he could get it now    Pt phone: [phone]     [id number] - [address] [address]  Phone: [phone]  Fax: [phone]

## 2024-09-24 ENCOUNTER — TELEMEDICINE (OUTPATIENT)
Dept: PRIMARY CARE | Facility: CLINIC | Age: 84
End: 2024-09-24
Payer: MEDICARE

## 2024-09-24 DIAGNOSIS — U07.1 COVID-19: Primary | ICD-10-CM

## 2024-09-24 PROCEDURE — 1160F RVW MEDS BY RX/DR IN RCRD: CPT | Performed by: STUDENT IN AN ORGANIZED HEALTH CARE EDUCATION/TRAINING PROGRAM

## 2024-09-24 PROCEDURE — 99213 OFFICE O/P EST LOW 20 MIN: CPT | Performed by: STUDENT IN AN ORGANIZED HEALTH CARE EDUCATION/TRAINING PROGRAM

## 2024-09-24 PROCEDURE — 1159F MED LIST DOCD IN RCRD: CPT | Performed by: STUDENT IN AN ORGANIZED HEALTH CARE EDUCATION/TRAINING PROGRAM

## 2024-09-24 RX ORDER — NIRMATRELVIR AND RITONAVIR 150-100 MG
2 KIT ORAL 2 TIMES DAILY
Qty: 20 TABLET | Refills: 0 | Status: SHIPPED | OUTPATIENT
Start: 2024-09-24 | End: 2024-09-29

## 2024-09-24 RX ORDER — AMLODIPINE BESYLATE 2.5 MG/1
2.5 TABLET ORAL AS NEEDED
COMMUNITY
Start: 2023-09-28

## 2024-09-24 RX ORDER — BISMUTH SUBSALICYLATE 262 MG/1
262 TABLET ORAL DAILY PRN
COMMUNITY

## 2024-09-24 RX ORDER — MULTIVITAMIN
1 TABLET ORAL DAILY
COMMUNITY

## 2024-09-24 ASSESSMENT — ENCOUNTER SYMPTOMS
SINUS PRESSURE: 0
FEVER: 0
MYALGIAS: 0
RHINORRHEA: 1
CHILLS: 0
HEARTBURN: 0
COUGH: 1
SORE THROAT: 1
HEADACHES: 1
HEMOPTYSIS: 0
SINUS PAIN: 0

## 2024-09-24 NOTE — PROGRESS NOTES
Subjective   Patient ID: Avtar Gonsales is a 83 y.o. male who presents for Sore Throat (Dry cough, stuffy nose and mild headaches ; positive home covid).    Sore Throat   Associated symptoms include congestion, coughing and headaches (sinus). Pertinent negatives include no ear pain.   Cough  This is a new problem. The current episode started yesterday. The problem has been gradually worsening. The problem occurs constantly. The cough is Non-productive. Associated symptoms include headaches (sinus), nasal congestion, postnasal drip, rhinorrhea and a sore throat. Pertinent negatives include no chest pain, chills, ear congestion, ear pain, fever, heartburn, hemoptysis or myalgias. Nothing aggravates the symptoms.        Symptoms started 2 days ago with a sore throat and then some coughing.  They were on a trip and his partner got sick.  She has since recovered.  His symptoms were worse over the last day or 2 and they seem to have stabilized.  He has taken some Zicam, other and extra zinc and then some Tylenol.  He denies any shortness of breath or chest pain or other concerns.    Got the initial COVID immunizations but nothing since.  He has never had COVID previously.    Review of Systems   Constitutional:  Negative for chills and fever.   HENT:  Positive for congestion, postnasal drip, rhinorrhea and sore throat. Negative for ear pain, sinus pressure and sinus pain.    Respiratory:  Positive for cough. Negative for hemoptysis.    Cardiovascular:  Negative for chest pain.   Gastrointestinal:  Negative for heartburn.   Musculoskeletal:  Negative for myalgias.   Neurological:  Positive for headaches (sinus).       Objective   There were no vitals taken for this visit.    Physical Exam    Assessment/Plan   Problem List Items Addressed This Visit    None  Visit Diagnoses         Codes    COVID-19    -  Primary U07.1    Relevant Medications    nirmatrelvir-ritonavir (Paxlovid) 150-100 mg tablet therapy pack          History  and physical examination as above.  Patient coming in after testing positive for COVID.  Discussed his current symptoms which he describes as fairly mild.  Did discuss conservative measures at home in addition to symptomatic treatment as well as the role, risks, benefits and side effects of Paxlovid.  He would primarily like to just focus on things he is doing at home as he denies any significant symptoms.  He did discuss that he would be comfortable with having the prescription sent to his pharmacy to see how he is doing over the next few days.  Did discuss that he needs to start the medication within 5 days of symptoms starting.  Did also discussed that if he does have any worsening symptoms such as chest pain or shortness of breath, then he needs to present immediately to the emergency department.  He is understanding and in agreement with this plan.    This visit was completed via telemedicine (video) call due to the restrictions of the COVID global pandemic. The visit was conducted between Avtar Gonsales, [facility]. and myself, Ab Castañeda DO, [facility]. The patient verbally consented to the telehealth visit and verbally confirmed their location. All issues were discussed and addressed as in the clinical documentation, but no physical exam was performed. If it was felt that the patient should be evaluated in a clinical setting, then they were directed there.  Spent 16:20 minutes with the patient over the telemedicine call and more than 50% of this time was spent in counseling and coordination of care.

## 2024-10-02 ENCOUNTER — OFFICE VISIT (OUTPATIENT)
Dept: PRIMARY CARE | Facility: CLINIC | Age: 84
End: 2024-10-02
Payer: MEDICARE

## 2024-10-02 VITALS
WEIGHT: 152 LBS | DIASTOLIC BLOOD PRESSURE: 78 MMHG | HEART RATE: 68 BPM | BODY MASS INDEX: 25.89 KG/M2 | OXYGEN SATURATION: 96 % | SYSTOLIC BLOOD PRESSURE: 138 MMHG

## 2024-10-02 DIAGNOSIS — R42 VERTIGO: ICD-10-CM

## 2024-10-02 DIAGNOSIS — U07.1 COVID-19: Primary | ICD-10-CM

## 2024-10-02 PROCEDURE — 1036F TOBACCO NON-USER: CPT | Performed by: NURSE PRACTITIONER

## 2024-10-02 PROCEDURE — 99214 OFFICE O/P EST MOD 30 MIN: CPT | Performed by: NURSE PRACTITIONER

## 2024-10-02 PROCEDURE — 1160F RVW MEDS BY RX/DR IN RCRD: CPT | Performed by: NURSE PRACTITIONER

## 2024-10-02 PROCEDURE — 3078F DIAST BP <80 MM HG: CPT | Performed by: NURSE PRACTITIONER

## 2024-10-02 PROCEDURE — 3075F SYST BP GE 130 - 139MM HG: CPT | Performed by: NURSE PRACTITIONER

## 2024-10-02 PROCEDURE — 1159F MED LIST DOCD IN RCRD: CPT | Performed by: NURSE PRACTITIONER

## 2024-10-02 RX ORDER — METHYLPREDNISOLONE 4 MG/1
TABLET ORAL
Qty: 21 TABLET | Refills: 0 | Status: SHIPPED | OUTPATIENT
Start: 2024-10-02 | End: 2024-10-09

## 2024-10-02 ASSESSMENT — COLUMBIA-SUICIDE SEVERITY RATING SCALE - C-SSRS
1. IN THE PAST MONTH, HAVE YOU WISHED YOU WERE DEAD OR WISHED YOU COULD GO TO SLEEP AND NOT WAKE UP?: NO
6. HAVE YOU EVER DONE ANYTHING, STARTED TO DO ANYTHING, OR PREPARED TO DO ANYTHING TO END YOUR LIFE?: NO
2. HAVE YOU ACTUALLY HAD ANY THOUGHTS OF KILLING YOURSELF?: NO

## 2024-10-02 ASSESSMENT — PATIENT HEALTH QUESTIONNAIRE - PHQ9
2. FEELING DOWN, DEPRESSED OR HOPELESS: NOT AT ALL
SUM OF ALL RESPONSES TO PHQ9 QUESTIONS 1 AND 2: 0
1. LITTLE INTEREST OR PLEASURE IN DOING THINGS: NOT AT ALL

## 2024-10-02 ASSESSMENT — ENCOUNTER SYMPTOMS
DEPRESSION: 0
LOSS OF SENSATION IN FEET: 0
OCCASIONAL FEELINGS OF UNSTEADINESS: 0

## 2024-10-02 NOTE — PROGRESS NOTES
Subjective   Patient ID: Avtar Gonsales is a 83 y.o. male who presents for Dizziness (Cough vertigo light headed).    HPI     Patient initially got sick with COVID on 9/23. He had cough, runny nose, and fatigue. He was prescribed Paxlovid but ended up not taking it since by the time he got it, he was feeling better and it would have been too late. On 9/27, his URI symptoms had improved but he started to feel extremely fatigued. On 9/30, he woke up and felt very dizzy and lightheaded. He took Meclizine yesterday which helped but it made him feel very tired. He has been feeling pretty dizzy/lightheaded mostly with position changes like turning in bed. He also reports a mild dry cough. He denies any blurry vision, headaches, chest pain, palpitations, shortness of breath, nausea, vomiting, diarrhea. He has a good appetite and has been drinking plenty of fluids.     Review of Systems  ROS negative except as noted above in HPI.     Objective   /78   Pulse 68   Wt 68.9 kg (152 lb)   SpO2 96%   BMI 25.89 kg/m²     Physical Exam  General: Alert and oriented, in no acute distress. Appears stated age, well-nourished, and well hydrated  HEENT:  - Head: Normocephalic and atraumatic   - Eyes: EOMI, PERRLA  - ENT: Hearing grossly intact. Mucus membranes pink and moist without lesions. Tonsils present without swelling or exudates. Good dentition. TMs gray  Neck: Supple. No stiffness. No thyromegaly or thyroid nodules  Heart: RRR. No murmurs, clicks, or rubs  Lungs: Unlabored breathing. CTAB with no crackles, wheezes, or rhonchi  Abdomen: Normal BS in all 4 quadrants. Soft, non-tender, non-distended, with no masses  Musculoskeletal: ROM intact. Strength 5/5 in BUE and BLE. No joint swelling. Normal gait and station  Neurological: Alert and oriented. No gross neurological deficits. Normal sensation. No weakness. Positive Nystagmus with Epley Maneuver.  Psychological: Appropriate mood and affect  Skin: No rash, abnormal  lesions, cyanosis, or erythema     Assessment/Plan   Vertigo  - + Nystagmus with Epley Maneuver  - Recommend trying benadryl (states this doesn't make him drowsy) and alprazolam (which he has at home)  -- If those don't work, can try medrol dose pack (prescription sent)  - Consider vestibular therapy  - Counseled on signs/symptoms that warrant follow up in the ED    HARRIETT Hyatt-CNP  [facility]

## 2024-10-04 ENCOUNTER — TELEPHONE (OUTPATIENT)
Dept: PRIMARY CARE | Facility: CLINIC | Age: 84
End: 2024-10-04
Payer: MEDICARE

## 2024-10-14 ENCOUNTER — OFFICE VISIT (OUTPATIENT)
Dept: PRIMARY CARE | Facility: CLINIC | Age: 84
End: 2024-10-14
Payer: MEDICARE

## 2024-10-14 VITALS
BODY MASS INDEX: 25.99 KG/M2 | WEIGHT: 152.2 LBS | DIASTOLIC BLOOD PRESSURE: 84 MMHG | HEIGHT: 64 IN | HEART RATE: 71 BPM | SYSTOLIC BLOOD PRESSURE: 136 MMHG | OXYGEN SATURATION: 97 %

## 2024-10-14 DIAGNOSIS — U07.1 COVID-19: ICD-10-CM

## 2024-10-14 DIAGNOSIS — R42 VERTIGO: Primary | ICD-10-CM

## 2024-10-14 PROBLEM — M06.00 SERONEGATIVE RHEUMATOID ARTHRITIS (MULTI): Status: RESOLVED | Noted: 2023-03-06 | Resolved: 2024-10-14

## 2024-10-14 PROBLEM — E87.1 HYPONATREMIA: Status: RESOLVED | Noted: 2023-08-08 | Resolved: 2024-10-14

## 2024-10-14 PROBLEM — H00.019 HORDEOLUM EXTERNUM: Status: RESOLVED | Noted: 2024-06-18 | Resolved: 2024-10-14

## 2024-10-14 PROBLEM — R35.0 URINARY FREQUENCY: Status: RESOLVED | Noted: 2023-03-06 | Resolved: 2024-10-14

## 2024-10-14 PROBLEM — K64.4 EXTERNAL HEMORRHOID: Status: RESOLVED | Noted: 2023-03-06 | Resolved: 2024-10-14

## 2024-10-14 PROCEDURE — 1159F MED LIST DOCD IN RCRD: CPT | Performed by: FAMILY MEDICINE

## 2024-10-14 PROCEDURE — 99214 OFFICE O/P EST MOD 30 MIN: CPT | Performed by: FAMILY MEDICINE

## 2024-10-14 PROCEDURE — 1160F RVW MEDS BY RX/DR IN RCRD: CPT | Performed by: FAMILY MEDICINE

## 2024-10-14 PROCEDURE — 3079F DIAST BP 80-89 MM HG: CPT | Performed by: FAMILY MEDICINE

## 2024-10-14 PROCEDURE — 1036F TOBACCO NON-USER: CPT | Performed by: FAMILY MEDICINE

## 2024-10-14 PROCEDURE — 3075F SYST BP GE 130 - 139MM HG: CPT | Performed by: FAMILY MEDICINE

## 2024-10-14 ASSESSMENT — ENCOUNTER SYMPTOMS
DIAPHORESIS: 0
ARTHRALGIAS: 0
ABDOMINAL PAIN: 0
SWOLLEN GLANDS: 0
FEVER: 0
LOSS OF SENSATION IN FEET: 0
COUGH: 1
CHANGE IN BOWEL HABIT: 0
NAUSEA: 0
VISUAL CHANGE: 0
JOINT SWELLING: 0
OCCASIONAL FEELINGS OF UNSTEADINESS: 0
DIZZINESS: 1
SORE THROAT: 0
FATIGUE: 1
NECK PAIN: 0
NUMBNESS: 0
CHILLS: 0
HEADACHES: 0
ANOREXIA: 0
WEAKNESS: 0
VOMITING: 0
VERTIGO: 1
DEPRESSION: 0
MYALGIAS: 0

## 2024-10-14 ASSESSMENT — PATIENT HEALTH QUESTIONNAIRE - PHQ9
10. IF YOU CHECKED OFF ANY PROBLEMS, HOW DIFFICULT HAVE THESE PROBLEMS MADE IT FOR YOU TO DO YOUR WORK, TAKE CARE OF THINGS AT HOME, OR GET ALONG WITH OTHER PEOPLE: NOT DIFFICULT AT ALL
2. FEELING DOWN, DEPRESSED OR HOPELESS: NOT AT ALL
1. LITTLE INTEREST OR PLEASURE IN DOING THINGS: NOT AT ALL
SUM OF ALL RESPONSES TO PHQ9 QUESTIONS 1 AND 2: 0

## 2024-10-14 NOTE — PATIENT INSTRUCTIONS
Start NielMed nasal rinse 2 times a day then follow it with Flonase  Can use Saline spray throughout the day  Cool mist humidifier  Call if any worse or if no improvement in 3-5 days  Increase fluids  Refer to vestibular rehab

## 2024-10-14 NOTE — PROGRESS NOTES
"Subjective   Patient ID: Avtar Gonsales is a 83 y.o. male who presents for Dizziness (Patient is here for dizziness that started 3 weeks ago started in the 10 th day into Covid seen the cnp on 10/02/2024. Was on the medrol dospak. Says it helped but dizziness came back yesterday ).  Today he is accompanied by alone.     Dizziness  This is a recurrent problem. The current episode started 1 to 4 weeks ago. The problem has been unchanged. Associated symptoms include coughing, fatigue and vertigo. Pertinent negatives include no abdominal pain, anorexia, arthralgias, change in bowel habit, chest pain, chills, congestion, diaphoresis, fever, headaches, joint swelling, myalgias, nausea, neck pain, numbness, rash, sore throat, swollen glands, urinary symptoms, visual change, vomiting or weakness. Exacerbated by: turning from left to right in bed in the morning. Treatments tried: prednisone and medrol. The treatment provided mild relief.       Review of Systems   Constitutional:  Positive for fatigue. Negative for chills, diaphoresis and fever.   HENT:  Negative for congestion and sore throat.    Respiratory:  Positive for cough.    Cardiovascular:  Negative for chest pain.   Gastrointestinal:  Negative for abdominal pain, anorexia, change in bowel habit, nausea and vomiting.   Musculoskeletal:  Negative for arthralgias, joint swelling, myalgias and neck pain.   Skin:  Negative for rash.   Neurological:  Positive for dizziness and vertigo. Negative for weakness, numbness and headaches.       Objective   /84   Pulse 71   Ht 1.632 m (5' 4.25\")   Wt 69 kg (152 lb 3.2 oz)   SpO2 97%   BMI 25.92 kg/m²   BSA: 1.77 meters squared  Growth percentiles: Facility age limit for growth %surjit is 20 years. Facility age limit for growth %surjit is 20 years.     Physical Exam  Vitals and nursing note reviewed.   Constitutional:       General: He is not in acute distress.     Appearance: Normal appearance. He is normal weight. He is " not ill-appearing, toxic-appearing or diaphoretic.   HENT:      Head: Normocephalic and atraumatic.      Right Ear: Tympanic membrane, ear canal and external ear normal. There is no impacted cerumen.      Left Ear: Tympanic membrane, ear canal and external ear normal. There is no impacted cerumen.      Nose: Nose normal. No congestion or rhinorrhea.      Mouth/Throat:      Mouth: Mucous membranes are moist.      Pharynx: Oropharynx is clear. No oropharyngeal exudate or posterior oropharyngeal erythema.   Eyes:      General: No scleral icterus.        Right eye: No discharge.         Left eye: No discharge.      Extraocular Movements: Extraocular movements intact.      Conjunctiva/sclera: Conjunctivae normal.      Pupils: Pupils are equal, round, and reactive to light.      Comments: No nystagmus   Neck:      Vascular: No carotid bruit.   Cardiovascular:      Rate and Rhythm: Normal rate and regular rhythm.      Pulses: Normal pulses.      Heart sounds: Murmur heard.      No friction rub. No gallop.   Pulmonary:      Effort: Pulmonary effort is normal. No respiratory distress.      Breath sounds: Normal breath sounds. No stridor. No wheezing, rhonchi or rales.   Chest:      Chest wall: No tenderness.   Musculoskeletal:      Cervical back: Normal range of motion and neck supple. No rigidity or tenderness.   Lymphadenopathy:      Cervical: Cervical adenopathy present.   Neurological:      General: No focal deficit present.      Mental Status: He is alert and oriented to person, place, and time.   Psychiatric:         Mood and Affect: Mood normal.         Behavior: Behavior normal.         Thought Content: Thought content normal.         Judgment: Judgment normal.         Assessment/Plan   Problem List Items Addressed This Visit             ICD-10-CM    Vertigo - Primary R42    Relevant Orders    Referral to Physical Therapy     Other Visit Diagnoses         Codes    COVID-19     U07.1          Start NielMed nasal rinse  2 times a day then follow it with Flonase  Can use Saline spray throughout the day  Cool mist humidifier  Call if any worse or if no improvement in 3-5 days  Increase fluids  Refer to vestibular rehab

## 2024-10-29 ENCOUNTER — APPOINTMENT (OUTPATIENT)
Dept: PRIMARY CARE | Facility: CLINIC | Age: 84
End: 2024-10-29
Payer: MEDICARE

## 2024-10-29 VITALS
SYSTOLIC BLOOD PRESSURE: 130 MMHG | WEIGHT: 151.6 LBS | BODY MASS INDEX: 25.82 KG/M2 | HEART RATE: 72 BPM | DIASTOLIC BLOOD PRESSURE: 68 MMHG | OXYGEN SATURATION: 98 %

## 2024-10-29 DIAGNOSIS — H81.23 VESTIBULAR NEURONITIS OF BOTH EARS: ICD-10-CM

## 2024-10-29 DIAGNOSIS — G47.00 INSOMNIA, UNSPECIFIED TYPE: ICD-10-CM

## 2024-10-29 DIAGNOSIS — R42 DIZZINESS: Primary | ICD-10-CM

## 2024-10-29 PROCEDURE — 1159F MED LIST DOCD IN RCRD: CPT | Performed by: FAMILY MEDICINE

## 2024-10-29 PROCEDURE — 3078F DIAST BP <80 MM HG: CPT | Performed by: FAMILY MEDICINE

## 2024-10-29 PROCEDURE — 1160F RVW MEDS BY RX/DR IN RCRD: CPT | Performed by: FAMILY MEDICINE

## 2024-10-29 PROCEDURE — 1036F TOBACCO NON-USER: CPT | Performed by: FAMILY MEDICINE

## 2024-10-29 PROCEDURE — 99495 TRANSJ CARE MGMT MOD F2F 14D: CPT | Performed by: FAMILY MEDICINE

## 2024-10-29 PROCEDURE — 3075F SYST BP GE 130 - 139MM HG: CPT | Performed by: FAMILY MEDICINE

## 2024-10-29 RX ORDER — MECLIZINE HYDROCHLORIDE 25 MG/1
25 TABLET ORAL EVERY 8 HOURS PRN
COMMUNITY
Start: 2024-10-21 | End: 2024-11-20

## 2024-10-29 ASSESSMENT — ENCOUNTER SYMPTOMS
PALPITATIONS: 0
CHILLS: 0
DIARRHEA: 0
WHEEZING: 0
FACIAL SWELLING: 0
EYE DISCHARGE: 0
CONSTIPATION: 0
COUGH: 0
COLOR CHANGE: 0
CONFUSION: 0
FEVER: 0
APPETITE CHANGE: 0
ACTIVITY CHANGE: 0
ARTHRALGIAS: 0

## 2024-11-04 DIAGNOSIS — G47.00 INSOMNIA, UNSPECIFIED TYPE: ICD-10-CM

## 2024-11-04 DIAGNOSIS — G47.00 INSOMNIA, UNSPECIFIED TYPE: Primary | ICD-10-CM

## 2024-11-04 RX ORDER — MIRTAZAPINE 7.5 MG/1
7.5 TABLET, FILM COATED ORAL NIGHTLY
Qty: 30 TABLET | Refills: 2 | Status: SHIPPED | OUTPATIENT
Start: 2024-11-04 | End: 2024-11-04 | Stop reason: SDUPTHER

## 2024-11-04 RX ORDER — MIRTAZAPINE 7.5 MG/1
7.5 TABLET, FILM COATED ORAL NIGHTLY
Qty: 30 TABLET | Refills: 2 | Status: SHIPPED | OUTPATIENT
Start: 2024-11-04 | End: 2025-02-02

## 2024-11-12 ENCOUNTER — APPOINTMENT (OUTPATIENT)
Dept: PRIMARY CARE | Facility: CLINIC | Age: 84
End: 2024-11-12
Payer: MEDICARE

## 2024-11-12 VITALS
DIASTOLIC BLOOD PRESSURE: 78 MMHG | BODY MASS INDEX: 25.89 KG/M2 | SYSTOLIC BLOOD PRESSURE: 132 MMHG | WEIGHT: 152 LBS | HEART RATE: 72 BPM

## 2024-11-12 DIAGNOSIS — H81.23 VESTIBULAR NEURONITIS OF BOTH EARS: ICD-10-CM

## 2024-11-12 DIAGNOSIS — G47.00 INSOMNIA, UNSPECIFIED TYPE: ICD-10-CM

## 2024-11-12 DIAGNOSIS — H61.23 BILATERAL IMPACTED CERUMEN: ICD-10-CM

## 2024-11-12 DIAGNOSIS — R42 DIZZINESS: Primary | ICD-10-CM

## 2024-11-12 PROCEDURE — 1159F MED LIST DOCD IN RCRD: CPT | Performed by: FAMILY MEDICINE

## 2024-11-12 PROCEDURE — 69210 REMOVE IMPACTED EAR WAX UNI: CPT | Performed by: FAMILY MEDICINE

## 2024-11-12 PROCEDURE — 1036F TOBACCO NON-USER: CPT | Performed by: FAMILY MEDICINE

## 2024-11-12 PROCEDURE — 1160F RVW MEDS BY RX/DR IN RCRD: CPT | Performed by: FAMILY MEDICINE

## 2024-11-12 PROCEDURE — 3075F SYST BP GE 130 - 139MM HG: CPT | Performed by: FAMILY MEDICINE

## 2024-11-12 PROCEDURE — 99214 OFFICE O/P EST MOD 30 MIN: CPT | Performed by: FAMILY MEDICINE

## 2024-11-12 PROCEDURE — 3078F DIAST BP <80 MM HG: CPT | Performed by: FAMILY MEDICINE

## 2024-11-12 ASSESSMENT — ENCOUNTER SYMPTOMS
CHILLS: 0
WHEEZING: 0
EYE DISCHARGE: 0
APPETITE CHANGE: 0
CONSTIPATION: 0
FACIAL SWELLING: 0
CONFUSION: 0
COUGH: 0
ARTHRALGIAS: 0
PALPITATIONS: 0
COLOR CHANGE: 0
DIARRHEA: 0
FEVER: 0
ACTIVITY CHANGE: 0

## 2024-11-12 NOTE — PROGRESS NOTES
"Subjective   Patient ID: Avtar Gonsales is a 84 y.o. male who presents for Follow-up (Vestibular neuritis. ).    HPI   Reports he is here to follow-up on his vestibular neuritis.  He had been started on trazodone to help with \insomnia but reports that this was not doing well he explains that he has not started on Remeron yet. He has been \"doing better with my sleep.\"     Review of Systems   Constitutional:  Negative for activity change, appetite change, chills and fever.   HENT:  Negative for congestion, ear discharge, ear pain and facial swelling.    Eyes:  Negative for discharge.   Respiratory:  Negative for cough and wheezing.    Cardiovascular:  Negative for chest pain, palpitations and leg swelling.   Gastrointestinal:  Negative for constipation and diarrhea.   Musculoskeletal:  Negative for arthralgias.   Skin:  Negative for color change and pallor.   Neurological:  Negative for syncope.   Psychiatric/Behavioral:  Negative for confusion.        Objective   /78 (BP Location: Left arm, Patient Position: Sitting)   Pulse 72   Wt 68.9 kg (152 lb)   BMI 25.89 kg/m²   BSA Body surface area is 1.77 meters squared.      Physical Exam  Constitutional:       General: He is not in acute distress.     Appearance: Normal appearance. He is not toxic-appearing.   HENT:      Head: Normocephalic.      Right Ear: There is impacted cerumen.      Left Ear: There is impacted cerumen.      Ears:      Comments: Bilateral cerumen impaction appreciated a cerumen spoon was utilized to attempt to remove wax this was unsuccessful and an ear irrigation was performed until visualized clear bilaterally to tympanic membranes with otoscope     Mouth/Throat:      Pharynx: Oropharynx is clear.   Eyes:      Conjunctiva/sclera: Conjunctivae normal.      Pupils: Pupils are equal, round, and reactive to light.   Cardiovascular:      Rate and Rhythm: Normal rate and regular rhythm.      Pulses: Normal pulses.      Heart sounds: Normal heart " sounds.   Pulmonary:      Effort: No respiratory distress.      Breath sounds: No wheezing, rhonchi or rales.   Musculoskeletal:         General: No swelling or tenderness.      Cervical back: No tenderness.   Skin:     Findings: No lesion or rash.   Neurological:      General: No focal deficit present.      Mental Status: He is alert and oriented to person, place, and time. Mental status is at baseline.      Gait: Gait normal.   Psychiatric:         Mood and Affect: Mood normal.         Behavior: Behavior normal.         Thought Content: Thought content normal.         Judgment: Judgment normal.       Lab on 06/14/2024   Component Date Value Ref Range Status    Amphetamine Screen, Urine 06/14/2024 Presumptive Negative  Presumptive Negative Final    CUTOFF LEVEL: 500 NG/ML   Cross-reactivity has been reported with high concentrations   of the following drugs: buproprion, chloroquine, chlorpromazine,   ephedrine, mephentermine, fenfluramine, phentermine,   phenylpropanolamine, pseudoephedrine, and propranolol.    Barbiturate Screen, Urine 06/14/2024 Presumptive Negative  Presumptive Negative Final    CUTOFF LEVEL: 200 NG/ML    Benzodiazepines Screen, Urine 06/14/2024 Presumptive Positive (A)  Presumptive Negative Final    CUTOFF LEVEL: 200 NG/ML    Cannabinoid Screen, Urine 06/14/2024 Presumptive Negative  Presumptive Negative Final    CUTOFF LEVEL: 50 NG/ML    Cocaine Metabolite Screen, Urine 06/14/2024 Presumptive Negative  Presumptive Negative Final    CUTOFF LEVEL: 150 NG/ML    Fentanyl Screen, Urine 06/14/2024 Presumptive Negative  Presumptive Negative Final    CUTOFF LEVEL: 5 NG/ML    Opiate Screen, Urine 06/14/2024 Presumptive Negative  Presumptive Negative Final    CUTOFF LEVEL: 300 NG/ML  The opiate screen does not detect fentanyl, meperidine, or   tramadol. Oxycodone is not consistently detected (refer to  Oxycodone Screen, Urine result).    Oxycodone Screen, Urine 06/14/2024 Presumptive Negative   Presumptive Negative Final    CUTOFF LEVEL: 100 NG/ML  This test will accurately detect both oxycodone and oxymorphone.    PCP Screen, Urine 06/14/2024 Presumptive Negative  Presumptive Negative Final    CUTOFF LEVEL:  25 NG/ML  Cross-reactivity has been reported with dextromethorphan.    Methadone Screen, Urine 06/14/2024 Presumptive Negative  Presumptive Negative Final    CUTOFF LEVEL: 150 NG/ML  The metabolite L-alpha-acetylmethadol (LAAM) is not  detected by this method in concentrations that would  be found in the urine of patients on LAAM therapy.    Clonazepam 06/14/2024 <25  <25 ng/mL Final    7-Aminoclonazepam 06/14/2024 <25  <25 ng/mL Final    Alprazolam 06/14/2024 51 (H)  <25 ng/mL Final    Consistent with use of a drug containing alprazolam, such as Xanax.      Alpha-Hydroxyalprazolam 06/14/2024 68 (H)  <25 ng/mL Final    Alprazolam metabolite; consistent with use of a drug containing alprazolam, such as Xanax.    Midazolam 06/14/2024 <25  <25 ng/mL Final    Alpha-Hydroxymidazolam 06/14/2024 <25  <25 ng/mL Final    Chlordiazepoxide 06/14/2024 <25  <25 ng/mL Final    Diazepam 06/14/2024 <25  <25 ng/mL Final    Nordiazepam 06/14/2024 <25  <25 ng/mL Final    Temazepam 06/14/2024 <25  <25 ng/mL Final    Oxazepam 06/14/2024 <25  <25 ng/mL Final    Lorazepam 06/14/2024 <25  <25 ng/mL Final   Hospital Outpatient Visit on 11/22/2023   Component Date Value Ref Range Status    Ventricular Rate 11/22/2023 65  BPM Final    Atrial Rate 11/22/2023 65  BPM Final    LA Interval 11/22/2023 150  ms Final    QRS Duration 11/22/2023 94  ms Final    QT Interval 11/22/2023 386  ms Final    QTC Calculation(Bazett) 11/22/2023 401  ms Final    P Axis 11/22/2023 67  degrees Final    R Axis 11/22/2023 -25  degrees Final    T Axis 11/22/2023 39  degrees Final    QRS Count 11/22/2023 11  beats Final    Q Onset 11/22/2023 210  ms Final    P Onset 11/22/2023 135  ms Final    P Offset 11/22/2023 202  ms Final    T Offset 11/22/2023 403   ms Final    QTC Fredericia 11/22/2023 396  ms Final     Current Outpatient Medications on File Prior to Visit   Medication Sig Dispense Refill    Bacillus coagulans-inulin 1 billion-250 cell-mg capsule Take 1 capsule by mouth once daily.      ezetimibe (Zetia) 10 mg tablet Take 1 tablet (10 mg) by mouth once daily.      losartan (Cozaar) 25 mg tablet Take 4 tablets (100 mg) by mouth once daily.      meclizine (Antivert) 25 mg tablet Take 1 tablet (25 mg) by mouth every 8 hours if needed.      metoprolol succinate XL (Toprol-XL) 100 mg 24 hr tablet Take 1 tablet (100 mg) by mouth once daily. 30 tablet 11    mirtazapine (Remeron) 7.5 mg tablet Take 1 tablet (7.5 mg) by mouth once daily at bedtime. 30 tablet 2    multivitamin tablet Take 1 tablet by mouth once daily.      vit A/vit C/vit E/zinc/copper (PRESERVISION AREDS ORAL) PreserVision      ZINC ACETATE ORAL once every 24 hours.       No current facility-administered medications on file prior to visit.     No images are attached to the encounter.            Assessment/Plan   Diagnoses and all orders for this visit:  Dizziness  Vestibular neuronitis of both ears  Insomnia, unspecified type  Bilateral impacted cerumen    1.  Patient to continue Antivert if needed, was recommended to discontinue alprazolam because of insomnia but should start Remeron for insomnia  2.  Patient to call questions or concerns

## 2024-11-25 ENCOUNTER — APPOINTMENT (OUTPATIENT)
Dept: PRIMARY CARE | Facility: CLINIC | Age: 84
End: 2024-11-25
Payer: MEDICARE

## 2024-11-26 ENCOUNTER — APPOINTMENT (OUTPATIENT)
Dept: PRIMARY CARE | Facility: CLINIC | Age: 84
End: 2024-11-26
Payer: MEDICARE

## 2024-11-26 VITALS
HEIGHT: 64 IN | HEART RATE: 69 BPM | WEIGHT: 154 LBS | BODY MASS INDEX: 26.29 KG/M2 | SYSTOLIC BLOOD PRESSURE: 132 MMHG | DIASTOLIC BLOOD PRESSURE: 78 MMHG

## 2024-11-26 DIAGNOSIS — Z00.00 ROUTINE GENERAL MEDICAL EXAMINATION AT HEALTH CARE FACILITY: ICD-10-CM

## 2024-11-26 DIAGNOSIS — H61.23 BILATERAL IMPACTED CERUMEN: ICD-10-CM

## 2024-11-26 DIAGNOSIS — Z23 NEED FOR VACCINATION: ICD-10-CM

## 2024-11-26 DIAGNOSIS — E53.8 VITAMIN B12 DEFICIENCY: ICD-10-CM

## 2024-11-26 DIAGNOSIS — Z71.89 ADVANCE DIRECTIVE DISCUSSED WITH PATIENT: ICD-10-CM

## 2024-11-26 DIAGNOSIS — I10 PRIMARY HYPERTENSION: ICD-10-CM

## 2024-11-26 DIAGNOSIS — Z85.46 H/O PROSTATE CANCER: ICD-10-CM

## 2024-11-26 DIAGNOSIS — H90.3 SENSORINEURAL HEARING LOSS, BILATERAL: ICD-10-CM

## 2024-11-26 DIAGNOSIS — F41.9 ANXIETY: ICD-10-CM

## 2024-11-26 DIAGNOSIS — E78.01 FAMILIAL HYPERCHOLESTEROLEMIA: ICD-10-CM

## 2024-11-26 DIAGNOSIS — Z00.00 HEALTHCARE MAINTENANCE: Primary | ICD-10-CM

## 2024-11-26 PROBLEM — H91.90 HEARING LOSS: Status: RESOLVED | Noted: 2023-03-06 | Resolved: 2024-11-26

## 2024-11-26 PROBLEM — K92.1 HEMATOCHEZIA: Status: RESOLVED | Noted: 2024-06-18 | Resolved: 2024-11-26

## 2024-11-26 PROCEDURE — 1170F FXNL STATUS ASSESSED: CPT | Performed by: FAMILY MEDICINE

## 2024-11-26 PROCEDURE — G0439 PPPS, SUBSEQ VISIT: HCPCS | Performed by: FAMILY MEDICINE

## 2024-11-26 PROCEDURE — 99497 ADVNCD CARE PLAN 30 MIN: CPT | Performed by: FAMILY MEDICINE

## 2024-11-26 PROCEDURE — 1160F RVW MEDS BY RX/DR IN RCRD: CPT | Performed by: FAMILY MEDICINE

## 2024-11-26 PROCEDURE — 99214 OFFICE O/P EST MOD 30 MIN: CPT | Performed by: FAMILY MEDICINE

## 2024-11-26 PROCEDURE — 3078F DIAST BP <80 MM HG: CPT | Performed by: FAMILY MEDICINE

## 2024-11-26 PROCEDURE — G0008 ADMIN INFLUENZA VIRUS VAC: HCPCS | Performed by: FAMILY MEDICINE

## 2024-11-26 PROCEDURE — 69210 REMOVE IMPACTED EAR WAX UNI: CPT | Performed by: FAMILY MEDICINE

## 2024-11-26 PROCEDURE — 1123F ACP DISCUSS/DSCN MKR DOCD: CPT | Performed by: FAMILY MEDICINE

## 2024-11-26 PROCEDURE — 90656 IIV3 VACC NO PRSV 0.5 ML IM: CPT | Performed by: FAMILY MEDICINE

## 2024-11-26 PROCEDURE — 1036F TOBACCO NON-USER: CPT | Performed by: FAMILY MEDICINE

## 2024-11-26 PROCEDURE — 1159F MED LIST DOCD IN RCRD: CPT | Performed by: FAMILY MEDICINE

## 2024-11-26 PROCEDURE — 3075F SYST BP GE 130 - 139MM HG: CPT | Performed by: FAMILY MEDICINE

## 2024-11-26 ASSESSMENT — ENCOUNTER SYMPTOMS
LOSS OF SENSATION IN FEET: 0
CONSTIPATION: 0
WHEEZING: 0
OCCASIONAL FEELINGS OF UNSTEADINESS: 0
ARTHRALGIAS: 0
COUGH: 0
DEPRESSION: 0
FEVER: 0
CHILLS: 0
COLOR CHANGE: 0
FACIAL SWELLING: 0
APPETITE CHANGE: 0
CONFUSION: 0
PALPITATIONS: 0
EYE DISCHARGE: 0
DIARRHEA: 0
ACTIVITY CHANGE: 0

## 2024-11-26 ASSESSMENT — PATIENT HEALTH QUESTIONNAIRE - PHQ9
1. LITTLE INTEREST OR PLEASURE IN DOING THINGS: NOT AT ALL
SUM OF ALL RESPONSES TO PHQ9 QUESTIONS 1 AND 2: 0
10. IF YOU CHECKED OFF ANY PROBLEMS, HOW DIFFICULT HAVE THESE PROBLEMS MADE IT FOR YOU TO DO YOUR WORK, TAKE CARE OF THINGS AT HOME, OR GET ALONG WITH OTHER PEOPLE: NOT DIFFICULT AT ALL
2. FEELING DOWN, DEPRESSED OR HOPELESS: NOT AT ALL

## 2024-11-26 ASSESSMENT — ACTIVITIES OF DAILY LIVING (ADL)
DOING_HOUSEWORK: INDEPENDENT
TAKING_MEDICATION: INDEPENDENT
BATHING: INDEPENDENT
DRESSING: INDEPENDENT
MANAGING_FINANCES: INDEPENDENT
GROCERY_SHOPPING: INDEPENDENT

## 2024-11-26 NOTE — PROGRESS NOTES
"Subjective   Reason for Visit: Avtar Gonsales is an 84 y.o. male here for a Medicare Wellness visit.     Past Medical, Surgical, and Family History reviewed and updated in chart.    Reviewed all medications by prescribing practitioner or clinical pharmacist (such as prescriptions, OTCs, herbal therapies and supplements) and documented in the medical record.    HPI  Patient presents for physical exam.      Fam Hx  Mom ()  Dad ()     Exercise walks  ETOH denies  Caffeine denies  Tobacco denies     Colonoscopy 2010 due ?  Urology, prostate cancer     Patient denies other complaints.   Patient Self Assessment of Health Status  Patient Self Assessment: Fair    Nutrition and Exercise  Current Diet: Well Balanced Diet  Adequate Fluid Intake: Yes  Caffeine: Yes  Exercise Frequency: Regularly    Functional Ability/Level of Safety  Cognitive Impairment Observed: No cognitive impairment observed  Cognitive Impairment Reported: No cognitive impairment reported by patient or family    Home Safety Risk Factors: No grab bars, bathroom    Patient Care Team:  Angie Sorto DO as PCP - General  Angie Sorto DO as PCP - [facility] Attributed Provider  Eben Patiño MD as Referring Physician (Urology)     Review of Systems   Constitutional:  Negative for activity change, appetite change, chills and fever.   HENT:  Negative for congestion, ear pain and facial swelling.    Eyes:  Negative for discharge.   Respiratory:  Negative for cough and wheezing.    Cardiovascular:  Negative for chest pain, palpitations and leg swelling.   Gastrointestinal:  Negative for constipation and diarrhea.   Musculoskeletal:  Negative for arthralgias.   Skin:  Negative for color change and pallor.   Neurological:  Negative for syncope.   Psychiatric/Behavioral:  Negative for confusion.        Objective   Vitals:  /78 (BP Location: Left arm, Patient Position: Sitting)   Pulse 69   Ht 1.626 m (5' 4\")   Wt 69.9 kg (154 lb)   BMI 26.43 " kg/m²       Physical Exam  Constitutional:       General: He is not in acute distress.     Appearance: Normal appearance. He is not toxic-appearing.   HENT:      Head: Normocephalic.      Right Ear: There is impacted cerumen.      Left Ear: There is impacted cerumen.      Ears:      Comments: Bilateral cerumen impaction appreciated a cerumen spoon was utilized to attempt to remove wax this was unsuccessful and an irrigation was performed until visualized clear bilaterally to tympanic members with otoscope  Eyes:      Conjunctiva/sclera: Conjunctivae normal.      Pupils: Pupils are equal, round, and reactive to light.   Cardiovascular:      Rate and Rhythm: Normal rate and regular rhythm.      Pulses: Normal pulses.      Heart sounds: Normal heart sounds.   Pulmonary:      Effort: No respiratory distress.      Breath sounds: No wheezing, rhonchi or rales.   Abdominal:      General: Bowel sounds are normal. There is no distension.      Palpations: Abdomen is soft.      Tenderness: There is no abdominal tenderness.   Musculoskeletal:         General: No swelling or tenderness.      Cervical back: No tenderness.   Skin:     Findings: No lesion or rash.   Neurological:      General: No focal deficit present.      Mental Status: He is alert and oriented to person, place, and time. Mental status is at baseline.      Gait: Gait normal.   Psychiatric:         Mood and Affect: Mood normal.         Behavior: Behavior normal.         Thought Content: Thought content normal.         Judgment: Judgment normal.         Assessment/Plan   Problem List Items Addressed This Visit       Anxiety    Primary hypertension    Vitamin B12 deficiency    Hyperlipidemia    H/O prostate cancer    Sensorineural hearing loss, bilateral     Other Visit Diagnoses       Healthcare maintenance    -  Primary    Need for vaccination        Routine general medical examination at health care facility        Relevant Orders    1 Year Follow Up In Advanced  Primary Care - PCP - Wellness Exam    Bilateral impacted cerumen                 1. Patient's blood work discussed at this office visit  2. Patient's LDL goal <130, current LDL 71  3. Patient's TG goal <150, current , continue on TYPE IV diet  4. Patient's glucose elevated start on no added sugar diet, patient's hemoglobin A1c 5.8 this is increased risk of developing diabetes  5. Patient to continue to see his urologist  6  Colonoscopy 2010 due ?  7. Patient to call if questions or concerns

## 2024-11-26 NOTE — ACP (ADVANCE CARE PLANNING)
Confirming Previous Code Status:   Advance Care Planning Note     Discussion Date: 11/26/24   Discussion Participants: patient    The patient wishes to discuss Advance Care Planning today and the following is a brief summary of our discussion.     Patient has capacity to make their own medical decisions: Yes  Health Care Agent/Surrogate Decision Maker documented in chart: Yes    Documents on file and valid:  Advance Directive/Living Will: Yes   Health Care Power of : Yes  Other:     Communication of Medical Status/Prognosis:   stable    Communication of Treatment Goals/Options:   stable    Treatment Decisions  Goals of Care: comfort is paramount; other goals are not relevant or achievable   DNR  Follow Up Plan  stable  Team Members  Stable  Time Statement: Total face to face time spent on advance care planning was 5 minutes with 16 minutes spent in counseling, including the explanation.    Angie Sorto DO  11/26/2024 1:32 PM

## 2024-11-27 ENCOUNTER — OFFICE VISIT (OUTPATIENT)
Dept: PRIMARY CARE | Facility: CLINIC | Age: 84
End: 2024-11-27
Payer: MEDICARE

## 2024-11-27 VITALS — DIASTOLIC BLOOD PRESSURE: 76 MMHG | HEART RATE: 68 BPM | SYSTOLIC BLOOD PRESSURE: 120 MMHG

## 2024-11-27 DIAGNOSIS — F41.9 ANXIETY: ICD-10-CM

## 2024-11-27 PROCEDURE — 1159F MED LIST DOCD IN RCRD: CPT | Performed by: FAMILY MEDICINE

## 2024-11-27 PROCEDURE — 1123F ACP DISCUSS/DSCN MKR DOCD: CPT | Performed by: FAMILY MEDICINE

## 2024-11-27 PROCEDURE — 3074F SYST BP LT 130 MM HG: CPT | Performed by: FAMILY MEDICINE

## 2024-11-27 PROCEDURE — 1036F TOBACCO NON-USER: CPT | Performed by: FAMILY MEDICINE

## 2024-11-27 PROCEDURE — 3078F DIAST BP <80 MM HG: CPT | Performed by: FAMILY MEDICINE

## 2024-11-27 PROCEDURE — 99214 OFFICE O/P EST MOD 30 MIN: CPT | Performed by: FAMILY MEDICINE

## 2024-11-27 RX ORDER — ALPRAZOLAM 0.25 MG/1
0.25 TABLET, ORALLY DISINTEGRATING ORAL 3 TIMES DAILY PRN
Qty: 60 TABLET | Refills: 0 | Status: SHIPPED | OUTPATIENT
Start: 2025-01-26 | End: 2025-02-25

## 2024-11-27 RX ORDER — ALPRAZOLAM 0.25 MG/1
0.25 TABLET, ORALLY DISINTEGRATING ORAL 3 TIMES DAILY PRN
Qty: 60 TABLET | Refills: 0 | Status: SHIPPED | OUTPATIENT
Start: 2024-11-27 | End: 2024-12-27

## 2024-11-27 RX ORDER — ALPRAZOLAM 0.25 MG/1
0.25 TABLET, ORALLY DISINTEGRATING ORAL 3 TIMES DAILY PRN
Qty: 60 TABLET | Refills: 0 | Status: SHIPPED | OUTPATIENT
Start: 2024-12-27 | End: 2025-01-26

## 2024-11-27 ASSESSMENT — ENCOUNTER SYMPTOMS
NERVOUS/ANXIOUS: 1
BLURRED VISION: 0
SHORTNESS OF BREATH: 0
EYE DISCHARGE: 0
HEADACHES: 0
EYE ITCHING: 0
ABDOMINAL PAIN: 0
ABDOMINAL DISTENTION: 0
FLANK PAIN: 0
EYE PAIN: 0
NECK PAIN: 0
DYSURIA: 0
HEMATURIA: 0
ORTHOPNEA: 0
FREQUENCY: 1
SWEATS: 0
APPETITE CHANGE: 0
ACTIVITY CHANGE: 0
PND: 0
PALPITATIONS: 0
CHEST TIGHTNESS: 0
HYPERTENSION: 1
EYE REDNESS: 0

## 2024-11-27 NOTE — PROGRESS NOTES
Subjective   Patient ID: Avtar Gonsales is a 84 y.o. male who presents for Med Refill.    Hypertension  This is a chronic problem. The current episode started more than 1 year ago. The problem is unchanged. The problem is controlled. Associated symptoms include anxiety. Pertinent negatives include no blurred vision, chest pain, headaches, malaise/fatigue, neck pain, orthopnea, palpitations, peripheral edema, PND, shortness of breath or sweats. There are no associated agents to hypertension. Risk factors for coronary artery disease include dyslipidemia and stress. There are no compliance problems.       Patient reports that he has been going to see Dr. Vu as an outpatient a few years ago who had prescribed him benzodiazapine for sleep. He reports that his wife, who had a rare form of brain cancer, had passed away. He has been having more difficulty with sleep and has been taking this medication more regularly. Dr. Vu has since retired.     Patient recently was in the emergency room for vestibular neuritis was recommended to go off of the alprazolam because this could increase the inflammation patient reports she has been unable to do so and unable to sleep because of not taking it.  He was recommended to take Remeron instead had tried trazodone but he felt like this made the vestibular neuritis worse.  Is going to try Remeron [facility]    Review of Systems   Constitutional:  Negative for activity change, appetite change and malaise/fatigue.   HENT:  Negative for congestion.    Eyes:  Negative for blurred vision, pain, discharge, redness and itching.   Respiratory:  Negative for chest tightness and shortness of breath.    Cardiovascular:  Negative for chest pain, palpitations, orthopnea and PND.   Gastrointestinal:  Negative for abdominal distention and abdominal pain.   Genitourinary:  Positive for frequency. Negative for dysuria, flank pain and hematuria.   Musculoskeletal:   Negative for neck pain.   Neurological:  Negative for headaches.   Psychiatric/Behavioral:  The patient is nervous/anxious.        Objective   /76 (BP Location: Left arm, Patient Position: Sitting)   Pulse 68   BSA There is no height or weight on file to calculate BSA.      Physical Exam  Constitutional:       General: He is not in acute distress.     Appearance: Normal appearance. He is normal weight. He is not ill-appearing or toxic-appearing.   HENT:      Head: Normocephalic.      Right Ear: Tympanic membrane, ear canal and external ear normal.      Left Ear: Tympanic membrane, ear canal and external ear normal.   Eyes:      General:         Right eye: No discharge.         Left eye: No discharge.      Extraocular Movements: Extraocular movements intact.      Conjunctiva/sclera: Conjunctivae normal.      Pupils: Pupils are equal, round, and reactive to light.   Cardiovascular:      Rate and Rhythm: Normal rate and regular rhythm.   Pulmonary:      Effort: Pulmonary effort is normal.      Breath sounds: Normal breath sounds.   Musculoskeletal:         General: Normal range of motion.   Skin:     General: Skin is warm and dry.   Neurological:      Mental Status: He is alert.   Psychiatric:         Mood and Affect: Mood normal.         Behavior: Behavior normal.         Thought Content: Thought content normal.         Judgment: Judgment normal.       Lab on 06/14/2024   Component Date Value Ref Range Status    Amphetamine Screen, Urine 06/14/2024 Presumptive Negative  Presumptive Negative Final    CUTOFF LEVEL: 500 NG/ML   Cross-reactivity has been reported with high concentrations   of the following drugs: buproprion, chloroquine, chlorpromazine,   ephedrine, mephentermine, fenfluramine, phentermine,   phenylpropanolamine, pseudoephedrine, and propranolol.    Barbiturate Screen, Urine 06/14/2024 Presumptive Negative  Presumptive Negative Final    CUTOFF LEVEL: 200 NG/ML    Benzodiazepines Screen, Urine  06/14/2024 Presumptive Positive (A)  Presumptive Negative Final    CUTOFF LEVEL: 200 NG/ML    Cannabinoid Screen, Urine 06/14/2024 Presumptive Negative  Presumptive Negative Final    CUTOFF LEVEL: 50 NG/ML    Cocaine Metabolite Screen, Urine 06/14/2024 Presumptive Negative  Presumptive Negative Final    CUTOFF LEVEL: 150 NG/ML    Fentanyl Screen, Urine 06/14/2024 Presumptive Negative  Presumptive Negative Final    CUTOFF LEVEL: 5 NG/ML    Opiate Screen, Urine 06/14/2024 Presumptive Negative  Presumptive Negative Final    CUTOFF LEVEL: 300 NG/ML  The opiate screen does not detect fentanyl, meperidine, or   tramadol. Oxycodone is not consistently detected (refer to  Oxycodone Screen, Urine result).    Oxycodone Screen, Urine 06/14/2024 Presumptive Negative  Presumptive Negative Final    CUTOFF LEVEL: 100 NG/ML  This test will accurately detect both oxycodone and oxymorphone.    PCP Screen, Urine 06/14/2024 Presumptive Negative  Presumptive Negative Final    CUTOFF LEVEL:  25 NG/ML  Cross-reactivity has been reported with dextromethorphan.    Methadone Screen, Urine 06/14/2024 Presumptive Negative  Presumptive Negative Final    CUTOFF LEVEL: 150 NG/ML  The metabolite L-alpha-acetylmethadol (LAAM) is not  detected by this method in concentrations that would  be found in the urine of patients on LAAM therapy.    Clonazepam 06/14/2024 <25  <25 ng/mL Final    7-Aminoclonazepam 06/14/2024 <25  <25 ng/mL Final    Alprazolam 06/14/2024 51 (H)  <25 ng/mL Final    Consistent with use of a drug containing alprazolam, such as Xanax.      Alpha-Hydroxyalprazolam 06/14/2024 68 (H)  <25 ng/mL Final    Alprazolam metabolite; consistent with use of a drug containing alprazolam, such as Xanax.    Midazolam 06/14/2024 <25  <25 ng/mL Final    Alpha-Hydroxymidazolam 06/14/2024 <25  <25 ng/mL Final    Chlordiazepoxide 06/14/2024 <25  <25 ng/mL Final    Diazepam 06/14/2024 <25  <25 ng/mL Final    Nordiazepam 06/14/2024 <25  <25 ng/mL Final     Temazepam 2024 <25  <25 ng/mL Final    Oxazepam 2024 <25  <25 ng/mL Final    Lorazepam 2024 <25  <25 ng/mL Final     Current Outpatient Medications on File Prior to Visit   Medication Sig Dispense Refill    Bacillus coagulans-inulin 1 billion-250 cell-mg capsule Take 1 capsule by mouth once daily.      ezetimibe (Zetia) 10 mg tablet Take 1 tablet (10 mg) by mouth once daily.      losartan (Cozaar) 25 mg tablet Take 4 tablets (100 mg) by mouth once daily.      metoprolol succinate XL (Toprol-XL) 100 mg 24 hr tablet Take 1 tablet (100 mg) by mouth once daily. 30 tablet 11    mirtazapine (Remeron) 7.5 mg tablet Take 1 tablet (7.5 mg) by mouth once daily at bedtime. 30 tablet 2    multivitamin tablet Take 1 tablet by mouth once daily.      vit A/vit C/vit E/zinc/copper (PRESERVISION AREDS ORAL) PreserVision      ZINC ACETATE ORAL once every 24 hours.      [] meclizine (Antivert) 25 mg tablet Take 1 tablet (25 mg) by mouth every 8 hours if needed.       No current facility-administered medications on file prior to visit.     No images are attached to the encounter.      [place]:  Angie Sorto DO on 2024 12:55 PM  I have personally reviewed the [place]AR[place] report for Avtar Gonsales. I have considered the risks of abuse, dependence, addiction and diversion    Is the patient prescribed a combination of a benzodiazepine and opioid?  No    Last Urine Drug Screen / ordered today: No  Recent Results (from the past 8760 hours)   Benzodiazepine Confirmation, Urine    Collection Time: 24 11:58 AM   Result Value Ref Range    Clonazepam <25 <25 ng/mL    7-Aminoclonazepam <25 <25 ng/mL    Alprazolam 51 (H) <25 ng/mL    Alpha-Hydroxyalprazolam 68 (H) <25 ng/mL    Midazolam <25 <25 ng/mL    Alpha-Hydroxymidazolam <25 <25 ng/mL    Chlordiazepoxide <25 <25 ng/mL    Diazepam <25 <25 ng/mL    Nordiazepam <25 <25 ng/mL    Temazepam <25 <25 ng/mL    Oxazepam <25 <25 ng/mL    Lorazepam <25 <25 ng/mL   Drug  Screen, Urine With Reflex to Confirmation    Collection Time: 06/14/24 11:58 AM   Result Value Ref Range    Amphetamine Screen, Urine Presumptive Negative Presumptive Negative    Barbiturate Screen, Urine Presumptive Negative Presumptive Negative    Benzodiazepines Screen, Urine Presumptive Positive (A) Presumptive Negative    Cannabinoid Screen, Urine Presumptive Negative Presumptive Negative    Cocaine Metabolite Screen, Urine Presumptive Negative Presumptive Negative    Fentanyl Screen, Urine Presumptive Negative Presumptive Negative    Opiate Screen, Urine Presumptive Negative Presumptive Negative    Oxycodone Screen, Urine Presumptive Negative Presumptive Negative    PCP Screen, Urine Presumptive Negative Presumptive Negative    Methadone Screen, Urine Presumptive Negative Presumptive Negative       Results are as expected.     Controlled Substance Agreement:  Date of the Last Agreement: 03/04/2024  Reviewed Controlled Substance Agreement including but not limited to the benefits, risks, and alternatives to treatment with a Controlled Substance medication(s).    Benzodiazepines:  What is the patient's goal of therapy? Less anxiety  Is this being achieved with current treatment? yes    MAXIM-7:  No data recorded        Activities of Daily Living:   Is your overall impression that this patient is benefiting (symptom reduction outweighs side effects) from benzodiazepine therapy? Yes     1. Physical Functioning: Same  2. Family Relationship: Same  3. Social Relationship: Same  4. Mood: Same  5. Sleep Patterns: Same  6. Overall Function: Same      Assessment/Plan   Problem List Items Addressed This Visit       Anxiety    Relevant Medications    ALPRAZolam (Niravam) 0.25 mg disintegrating tablet    ALPRAZolam (Niravam) 0.25 mg disintegrating tablet (Start on 12/27/2024)    ALPRAZolam (Niravam) 0.25 mg disintegrating tablet (Start on 1/26/2025)       1. Patient to come in one month to discuss possible switch from  lorazepam to ativan  2.  Patient has signed his benzodiazepine contract 03/04/2024  3.  Patient's urine drug screen performed 06/17/2024  4.  Patient to call if questions or concerns

## 2024-12-30 DIAGNOSIS — R42 VERTIGO: ICD-10-CM

## 2024-12-30 RX ORDER — MECLIZINE HCL 12.5 MG 12.5 MG/1
25 TABLET ORAL 3 TIMES DAILY PRN
Qty: 30 TABLET | Refills: 11 | Status: SHIPPED | OUTPATIENT
Start: 2024-12-30 | End: 2025-12-30

## 2025-02-13 DIAGNOSIS — G47.00 INSOMNIA, UNSPECIFIED TYPE: ICD-10-CM

## 2025-02-13 RX ORDER — MIRTAZAPINE 7.5 MG/1
7.5 TABLET, FILM COATED ORAL NIGHTLY
Qty: 30 TABLET | Refills: 0 | Status: SHIPPED | OUTPATIENT
Start: 2025-02-13

## 2025-03-21 ENCOUNTER — APPOINTMENT (OUTPATIENT)
Dept: PRIMARY CARE | Facility: CLINIC | Age: 85
End: 2025-03-21
Payer: MEDICARE

## 2025-03-21 VITALS
WEIGHT: 152.4 LBS | BODY MASS INDEX: 26.02 KG/M2 | HEART RATE: 62 BPM | SYSTOLIC BLOOD PRESSURE: 134 MMHG | HEIGHT: 64 IN | DIASTOLIC BLOOD PRESSURE: 80 MMHG

## 2025-03-21 DIAGNOSIS — F41.9 ANXIETY: ICD-10-CM

## 2025-03-21 PROCEDURE — 3079F DIAST BP 80-89 MM HG: CPT | Performed by: FAMILY MEDICINE

## 2025-03-21 PROCEDURE — 1123F ACP DISCUSS/DSCN MKR DOCD: CPT | Performed by: FAMILY MEDICINE

## 2025-03-21 PROCEDURE — 1036F TOBACCO NON-USER: CPT | Performed by: FAMILY MEDICINE

## 2025-03-21 PROCEDURE — 99214 OFFICE O/P EST MOD 30 MIN: CPT | Performed by: FAMILY MEDICINE

## 2025-03-21 PROCEDURE — 1159F MED LIST DOCD IN RCRD: CPT | Performed by: FAMILY MEDICINE

## 2025-03-21 PROCEDURE — 3075F SYST BP GE 130 - 139MM HG: CPT | Performed by: FAMILY MEDICINE

## 2025-03-21 RX ORDER — ZINC GLUCONATE 50 MG
1 TABLET ORAL
COMMUNITY

## 2025-03-21 RX ORDER — ALPRAZOLAM 0.25 MG/1
0.25 TABLET, ORALLY DISINTEGRATING ORAL 3 TIMES DAILY PRN
Qty: 60 TABLET | Refills: 0 | Status: SHIPPED | OUTPATIENT
Start: 2025-03-21 | End: 2025-04-20

## 2025-03-21 RX ORDER — ALPRAZOLAM 0.25 MG/1
0.25 TABLET, ORALLY DISINTEGRATING ORAL 3 TIMES DAILY PRN
Qty: 60 TABLET | Refills: 0 | Status: SHIPPED | OUTPATIENT
Start: 2025-04-20 | End: 2025-05-20

## 2025-03-21 RX ORDER — ALPRAZOLAM 0.25 MG/1
0.25 TABLET, ORALLY DISINTEGRATING ORAL 3 TIMES DAILY PRN
Qty: 60 TABLET | Refills: 0 | Status: SHIPPED | OUTPATIENT
Start: 2025-05-20 | End: 2025-06-19

## 2025-03-21 ASSESSMENT — ENCOUNTER SYMPTOMS
DEPRESSION: 0
OCCASIONAL FEELINGS OF UNSTEADINESS: 0
APPETITE CHANGE: 0
NERVOUS/ANXIOUS: 1
ORTHOPNEA: 0
FLANK PAIN: 0
HYPERTENSION: 1
PND: 0
EYE PAIN: 0
NECK PAIN: 0
ABDOMINAL DISTENTION: 0
CHEST TIGHTNESS: 0
SHORTNESS OF BREATH: 0
HEADACHES: 0
ACTIVITY CHANGE: 0
EYE REDNESS: 0
BLURRED VISION: 0
EYE DISCHARGE: 0
PALPITATIONS: 0
DYSURIA: 0
SWEATS: 0
ABDOMINAL PAIN: 0
EYE ITCHING: 0
FREQUENCY: 1
HEMATURIA: 0
LOSS OF SENSATION IN FEET: 0

## 2025-03-21 ASSESSMENT — PATIENT HEALTH QUESTIONNAIRE - PHQ9
SUM OF ALL RESPONSES TO PHQ9 QUESTIONS 1 AND 2: 1
1. LITTLE INTEREST OR PLEASURE IN DOING THINGS: NOT AT ALL
10. IF YOU CHECKED OFF ANY PROBLEMS, HOW DIFFICULT HAVE THESE PROBLEMS MADE IT FOR YOU TO DO YOUR WORK, TAKE CARE OF THINGS AT HOME, OR GET ALONG WITH OTHER PEOPLE: NOT DIFFICULT AT ALL
2. FEELING DOWN, DEPRESSED OR HOPELESS: SEVERAL DAYS

## 2025-03-21 ASSESSMENT — ANXIETY QUESTIONNAIRES
7. FEELING AFRAID AS IF SOMETHING AWFUL MIGHT HAPPEN: NOT AT ALL
1. FEELING NERVOUS, ANXIOUS, OR ON EDGE: SEVERAL DAYS
IF YOU CHECKED OFF ANY PROBLEMS ON THIS QUESTIONNAIRE, HOW DIFFICULT HAVE THESE PROBLEMS MADE IT FOR YOU TO DO YOUR WORK, TAKE CARE OF THINGS AT HOME, OR GET ALONG WITH OTHER PEOPLE: NOT DIFFICULT AT ALL
2. NOT BEING ABLE TO STOP OR CONTROL WORRYING: SEVERAL DAYS
4. TROUBLE RELAXING: NOT AT ALL
GAD7 TOTAL SCORE: 2
5. BEING SO RESTLESS THAT IT IS HARD TO SIT STILL: NOT AT ALL
6. BECOMING EASILY ANNOYED OR IRRITABLE: NOT AT ALL
3. WORRYING TOO MUCH ABOUT DIFFERENT THINGS: NOT AT ALL

## 2025-03-21 NOTE — PROGRESS NOTES
Subjective   Patient ID: Avtar Gonsales is a 84 y.o. male who presents for Med Refill.    Hypertension  This is a chronic problem. The current episode started more than 1 year ago. The problem is unchanged. The problem is controlled. Associated symptoms include anxiety. Pertinent negatives include no blurred vision, chest pain, headaches, malaise/fatigue, neck pain, orthopnea, palpitations, peripheral edema, PND, shortness of breath or sweats. There are no associated agents to hypertension. Risk factors for coronary artery disease include dyslipidemia and stress. There are no compliance problems.    Med Refill  Pertinent negatives include no abdominal pain, chest pain, congestion, headaches or neck pain.      Patient reports that he has been going to see Dr. Vu as an outpatient a few years ago who had prescribed him benzodiazapine for sleep. He reports that his wife, who had a rare form of brain cancer, had passed away. He has been having more difficulty with sleep and has been taking this medication more regularly. Dr. Vu has since retired.     Patient was in the emergency room last year for vestibular neuritis was recommended to go off of the alprazolam because this could increase the inflammation patient reports she has been unable to do so and unable to sleep because of not taking it.  He was recommended to take Remeron instead had tried trazodone but he felt like this made the vestibular neuritis worse.  Was going to try Remeron when [facility] [place] [facility]. He reports that he is is still taking Remeron but has needed to take alprazolam as well.'j    Review of Systems   Constitutional:  Negative for activity change, appetite change and malaise/fatigue.   HENT:  Negative for congestion.    Eyes:  Negative for blurred vision, pain, discharge, redness and itching.   Respiratory:  Negative for chest tightness and shortness of breath.    Cardiovascular:  Negative for chest pain,  "palpitations, orthopnea and PND.   Gastrointestinal:  Negative for abdominal distention and abdominal pain.   Genitourinary:  Positive for frequency. Negative for dysuria, flank pain and hematuria.   Musculoskeletal:  Negative for neck pain.   Neurological:  Negative for headaches.   Psychiatric/Behavioral:  The patient is nervous/anxious.        Objective   /80 (BP Location: Left arm, Patient Position: Sitting)   Pulse 62   Ht 1.626 m (5' 4\")   Wt 69.1 kg (152 lb 6.4 oz)   BMI 26.16 kg/m²   BSA Body surface area is 1.77 meters squared.      Physical Exam  Constitutional:       General: He is not in acute distress.     Appearance: Normal appearance. He is normal weight. He is not ill-appearing or toxic-appearing.   HENT:      Head: Normocephalic.      Right Ear: Tympanic membrane, ear canal and external ear normal.      Left Ear: Tympanic membrane, ear canal and external ear normal.   Eyes:      General:         Right eye: No discharge.         Left eye: No discharge.      Extraocular Movements: Extraocular movements intact.      Conjunctiva/sclera: Conjunctivae normal.      Pupils: Pupils are equal, round, and reactive to light.   Cardiovascular:      Rate and Rhythm: Normal rate and regular rhythm.   Pulmonary:      Effort: Pulmonary effort is normal.      Breath sounds: Normal breath sounds.   Musculoskeletal:         General: Normal range of motion.   Skin:     General: Skin is warm and dry.   Neurological:      Mental Status: He is alert.   Psychiatric:         Mood and Affect: Mood normal.         Behavior: Behavior normal.         Thought Content: Thought content normal.         Judgment: Judgment normal.       Lab on 06/14/2024   Component Date Value Ref Range Status    Amphetamine Screen, Urine 06/14/2024 Presumptive Negative  Presumptive Negative Final    CUTOFF LEVEL: 500 NG/ML   Cross-reactivity has been reported with high concentrations   of the following drugs: buproprion, chloroquine, " chlorpromazine,   ephedrine, mephentermine, fenfluramine, phentermine,   phenylpropanolamine, pseudoephedrine, and propranolol.    Barbiturate Screen, Urine 06/14/2024 Presumptive Negative  Presumptive Negative Final    CUTOFF LEVEL: 200 NG/ML    Benzodiazepines Screen, Urine 06/14/2024 Presumptive Positive (A)  Presumptive Negative Final    CUTOFF LEVEL: 200 NG/ML    Cannabinoid Screen, Urine 06/14/2024 Presumptive Negative  Presumptive Negative Final    CUTOFF LEVEL: 50 NG/ML    Cocaine Metabolite Screen, Urine 06/14/2024 Presumptive Negative  Presumptive Negative Final    CUTOFF LEVEL: 150 NG/ML    Fentanyl Screen, Urine 06/14/2024 Presumptive Negative  Presumptive Negative Final    CUTOFF LEVEL: 5 NG/ML    Opiate Screen, Urine 06/14/2024 Presumptive Negative  Presumptive Negative Final    CUTOFF LEVEL: 300 NG/ML  The opiate screen does not detect fentanyl, meperidine, or   tramadol. Oxycodone is not consistently detected (refer to  Oxycodone Screen, Urine result).    Oxycodone Screen, Urine 06/14/2024 Presumptive Negative  Presumptive Negative Final    CUTOFF LEVEL: 100 NG/ML  This test will accurately detect both oxycodone and oxymorphone.    PCP Screen, Urine 06/14/2024 Presumptive Negative  Presumptive Negative Final    CUTOFF LEVEL:  25 NG/ML  Cross-reactivity has been reported with dextromethorphan.    Methadone Screen, Urine 06/14/2024 Presumptive Negative  Presumptive Negative Final    CUTOFF LEVEL: 150 NG/ML  The metabolite L-alpha-acetylmethadol (LAAM) is not  detected by this method in concentrations that would  be found in the urine of patients on LAAM therapy.    Clonazepam 06/14/2024 <25  <25 ng/mL Final    7-Aminoclonazepam 06/14/2024 <25  <25 ng/mL Final    Alprazolam 06/14/2024 51 (H)  <25 ng/mL Final    Consistent with use of a drug containing alprazolam, such as Xanax.      Alpha-Hydroxyalprazolam 06/14/2024 68 (H)  <25 ng/mL Final    Alprazolam metabolite; consistent with use of a drug  containing alprazolam, such as Xanax.    Midazolam 06/14/2024 <25  <25 ng/mL Final    Alpha-Hydroxymidazolam 06/14/2024 <25  <25 ng/mL Final    Chlordiazepoxide 06/14/2024 <25  <25 ng/mL Final    Diazepam 06/14/2024 <25  <25 ng/mL Final    Nordiazepam 06/14/2024 <25  <25 ng/mL Final    Temazepam 06/14/2024 <25  <25 ng/mL Final    Oxazepam 06/14/2024 <25  <25 ng/mL Final    Lorazepam 06/14/2024 <25  <25 ng/mL Final     Current Outpatient Medications on File Prior to Visit   Medication Sig Dispense Refill    Bacillus coagulans-inulin 1 billion-250 cell-mg capsule Take 1 capsule by mouth once daily.      ezetimibe (Zetia) 10 mg tablet Take 1 tablet (10 mg) by mouth once daily.      losartan (Cozaar) 25 mg tablet Take 4 tablets (100 mg) by mouth once daily.      meclizine (Antivert) 12.5 mg tablet Take 2 tablets (25 mg) by mouth 3 times a day as needed for dizziness. 30 tablet 11    metoprolol succinate XL (Toprol-XL) 100 mg 24 hr tablet Take 1 tablet (100 mg) by mouth once daily. 30 tablet 11    mirtazapine (Remeron) 7.5 mg tablet TAKE ONE TABLET BY MOUTH EVERY DAY AT BEDTIME 30 tablet 0    multivitamin tablet Take 1 tablet by mouth once daily.      vit A/vit C/vit E/zinc/copper (PRESERVISION AREDS ORAL) PreserVision      zinc gluconate 50 mg tablet Take 1 tablet (50 mg) by mouth once daily.      ZINC ACETATE ORAL once every 24 hours. (Patient not taking: Reported on 3/21/2025)      [DISCONTINUED] ALPRAZolam (Niravam) 0.25 mg disintegrating tablet Dissolve 1 tablet (0.25 mg) in the mouth 3 times a day as needed for anxiety. 60 tablet 0    [DISCONTINUED] ALPRAZolam (Niravam) 0.25 mg disintegrating tablet Dissolve 1 tablet (0.25 mg) in the mouth 3 times a day as needed for anxiety. Do not fill before December 27, 2024. 60 tablet 0    [DISCONTINUED] ALPRAZolam (Niravam) 0.25 mg disintegrating tablet Dissolve 1 tablet (0.25 mg) in the mouth 3 times a day as needed for anxiety. Do not fill before January 26, 2025. 60 tablet  0     No current facility-administered medications on file prior to visit.     No images are attached to the encounter.      OARRS:  Angie Sorto, DO on 3/21/2025  7:45 AM  I have personally reviewed the OARRS report for Avtar Gonsales. I have considered the risks of abuse, dependence, addiction and diversion    Is the patient prescribed a combination of a benzodiazepine and opioid?  No    Last Urine Drug Screen / ordered today: No  Recent Results (from the past 8760 hours)   Benzodiazepine Confirmation, Urine    Collection Time: 06/14/24 11:58 AM   Result Value Ref Range    Clonazepam <25 <25 ng/mL    7-Aminoclonazepam <25 <25 ng/mL    Alprazolam 51 (H) <25 ng/mL    Alpha-Hydroxyalprazolam 68 (H) <25 ng/mL    Midazolam <25 <25 ng/mL    Alpha-Hydroxymidazolam <25 <25 ng/mL    Chlordiazepoxide <25 <25 ng/mL    Diazepam <25 <25 ng/mL    Nordiazepam <25 <25 ng/mL    Temazepam <25 <25 ng/mL    Oxazepam <25 <25 ng/mL    Lorazepam <25 <25 ng/mL   Drug Screen, Urine With Reflex to Confirmation    Collection Time: 06/14/24 11:58 AM   Result Value Ref Range    Amphetamine Screen, Urine Presumptive Negative Presumptive Negative    Barbiturate Screen, Urine Presumptive Negative Presumptive Negative    Benzodiazepines Screen, Urine Presumptive Positive (A) Presumptive Negative    Cannabinoid Screen, Urine Presumptive Negative Presumptive Negative    Cocaine Metabolite Screen, Urine Presumptive Negative Presumptive Negative    Fentanyl Screen, Urine Presumptive Negative Presumptive Negative    Opiate Screen, Urine Presumptive Negative Presumptive Negative    Oxycodone Screen, Urine Presumptive Negative Presumptive Negative    PCP Screen, Urine Presumptive Negative Presumptive Negative    Methadone Screen, Urine Presumptive Negative Presumptive Negative       Results are as expected.     Controlled Substance Agreement:  Date of the Last Agreement: 03/04/2024  Reviewed Controlled Substance Agreement including but not limited to  the benefits, risks, and alternatives to treatment with a Controlled Substance medication(s).    Benzodiazepines:  What is the patient's goal of therapy? Less anxiety  Is this being achieved with current treatment? yes    MAXIM-7:  Over the last 2 weeks, how often have you been bothered by any of the following problems?  Feeling nervous, anxious, or on edge: 1  Not being able to stop or control worryin  Worrying too much about different things: 0  Trouble relaxin  Being so restless that it is hard to sit still: 0  Becoming easily annoyed or irritable: 0  Feeling afraid as if something awful might happen: 0  MAXIM-7 Total Score: 2            Activities of Daily Living:   Is your overall impression that this patient is benefiting (symptom reduction outweighs side effects) from benzodiazepine therapy? Yes     1. Physical Functioning: Same  2. Family Relationship: Same  3. Social Relationship: Same  4. Mood: Same  5. Sleep Patterns: Same  6. Overall Function: Same      Assessment/Plan   Problem List Items Addressed This Visit       Anxiety    Relevant Medications    ALPRAZolam (Niravam) 0.25 mg disintegrating tablet    ALPRAZolam (Niravam) 0.25 mg disintegrating tablet (Start on 2025)    ALPRAZolam (Niravam) 0.25 mg disintegrating tablet (Start on 2025)         1. Patient to come in one month to discuss possible switch from lorazepam to ativan  2.  Patient has signed his benzodiazepine contract 2024  3.  Patient's urine drug screen performed 2024  4.  Patient to call if questions or concerns

## 2025-04-10 DIAGNOSIS — G47.00 INSOMNIA, UNSPECIFIED TYPE: ICD-10-CM

## 2025-04-10 RX ORDER — MIRTAZAPINE 7.5 MG/1
7.5 TABLET, FILM COATED ORAL NIGHTLY
Qty: 30 TABLET | Refills: 0 | Status: SHIPPED | OUTPATIENT
Start: 2025-04-10

## 2025-06-11 ENCOUNTER — APPOINTMENT (OUTPATIENT)
Dept: PRIMARY CARE | Facility: CLINIC | Age: 85
End: 2025-06-11
Payer: MEDICARE

## 2025-06-11 VITALS
HEART RATE: 69 BPM | BODY MASS INDEX: 26.16 KG/M2 | DIASTOLIC BLOOD PRESSURE: 76 MMHG | SYSTOLIC BLOOD PRESSURE: 138 MMHG | WEIGHT: 152.4 LBS

## 2025-06-11 DIAGNOSIS — Z79.899 CHRONIC PRESCRIPTION BENZODIAZEPINE USE: ICD-10-CM

## 2025-06-11 DIAGNOSIS — F41.9 ANXIETY: Primary | ICD-10-CM

## 2025-06-11 DIAGNOSIS — M06.00 SERONEGATIVE RHEUMATOID ARTHRITIS (MULTI): ICD-10-CM

## 2025-06-11 PROCEDURE — 1159F MED LIST DOCD IN RCRD: CPT | Performed by: FAMILY MEDICINE

## 2025-06-11 PROCEDURE — 3078F DIAST BP <80 MM HG: CPT | Performed by: FAMILY MEDICINE

## 2025-06-11 PROCEDURE — 3075F SYST BP GE 130 - 139MM HG: CPT | Performed by: FAMILY MEDICINE

## 2025-06-11 PROCEDURE — 99214 OFFICE O/P EST MOD 30 MIN: CPT | Performed by: FAMILY MEDICINE

## 2025-06-11 PROCEDURE — 1036F TOBACCO NON-USER: CPT | Performed by: FAMILY MEDICINE

## 2025-06-11 RX ORDER — ALPRAZOLAM 0.25 MG/1
0.25 TABLET, ORALLY DISINTEGRATING ORAL 3 TIMES DAILY PRN
Qty: 60 TABLET | Refills: 2 | Status: SHIPPED | OUTPATIENT
Start: 2025-06-11 | End: 2025-09-09

## 2025-06-11 ASSESSMENT — ENCOUNTER SYMPTOMS
EYE REDNESS: 0
DYSURIA: 0
EYE ITCHING: 0
APPETITE CHANGE: 0
PALPITATIONS: 0
HYPERTENSION: 1
FREQUENCY: 1
EYE PAIN: 0
SWEATS: 0
HEMATURIA: 0
DEPRESSION: 0
CHEST TIGHTNESS: 0
OCCASIONAL FEELINGS OF UNSTEADINESS: 0
NECK PAIN: 0
BLURRED VISION: 0
LOSS OF SENSATION IN FEET: 0
EYE DISCHARGE: 0
PND: 0
ABDOMINAL PAIN: 0
HEADACHES: 0
ABDOMINAL DISTENTION: 0
SHORTNESS OF BREATH: 0
NERVOUS/ANXIOUS: 1
FLANK PAIN: 0
ORTHOPNEA: 0
ACTIVITY CHANGE: 0

## 2025-06-11 NOTE — PROGRESS NOTES
Subjective   Patient ID: Avtar Gonsales is a 84 y.o. male who presents for Med Refill.    Hypertension  This is a chronic problem. The current episode started more than 1 year ago. The problem is unchanged. The problem is controlled. Associated symptoms include anxiety. Pertinent negatives include no blurred vision, chest pain, headaches, malaise/fatigue, neck pain, orthopnea, palpitations, peripheral edema, PND, shortness of breath or sweats. There are no associated agents to hypertension. Risk factors for coronary artery disease include dyslipidemia and stress. There are no compliance problems.    Med Refill  Pertinent negatives include no abdominal pain, chest pain, congestion, headaches or neck pain.      Patient reports that he has been going to see Dr. Vu as an outpatient a few years ago who had prescribed him benzodiazapine for sleep. He reports that his wife, who had a rare form of brain cancer, had passed away. He has been having more difficulty with sleep and has been taking this medication more regularly. Dr. Vu has since retired.     Patient was in the emergency room last year for vestibular neuritis was recommended to go off of the alprazolam because this could increase the inflammation patient reports she has been unable to do so and unable to sleep because of not taking it.  He was recommended to take Remeron instead had tried trazodone but he felt like this made the vestibular neuritis worse.  Was going to try Remeron when his daughter is in town for [place]. He reports that he is is still taking Remeron but has needed to take alprazolam as well.'j    Review of Systems   Constitutional:  Negative for activity change, appetite change and malaise/fatigue.   HENT:  Negative for congestion.    Eyes:  Negative for blurred vision, pain, discharge, redness and itching.   Respiratory:  Negative for chest tightness and shortness of breath.    Cardiovascular:  Negative for chest pain,  palpitations, orthopnea and PND.   Gastrointestinal:  Negative for abdominal distention and abdominal pain.   Genitourinary:  Positive for frequency. Negative for dysuria, flank pain and hematuria.   Musculoskeletal:  Negative for neck pain.   Neurological:  Negative for headaches.   Psychiatric/Behavioral:  The patient is nervous/anxious.        Objective   /76 (BP Location: Left arm, Patient Position: Sitting)   Pulse 69   Wt 69.1 kg (152 lb 6.4 oz)   BMI 26.16 kg/m²   BSA Body surface area is 1.77 meters squared.      Physical Exam  Constitutional:       General: He is not in acute distress.     Appearance: Normal appearance. He is normal weight. He is not ill-appearing or toxic-appearing.   HENT:      Head: Normocephalic.      Right Ear: Tympanic membrane, ear canal and external ear normal.      Left Ear: Tympanic membrane, ear canal and external ear normal.   Eyes:      General:         Right eye: No discharge.         Left eye: No discharge.      Extraocular Movements: Extraocular movements intact.      Conjunctiva/sclera: Conjunctivae normal.      Pupils: Pupils are equal, round, and reactive to light.   Cardiovascular:      Rate and Rhythm: Normal rate and regular rhythm.   Pulmonary:      Effort: Pulmonary effort is normal.      Breath sounds: Normal breath sounds.   Musculoskeletal:         General: Normal range of motion.   Skin:     General: Skin is warm and dry.   Neurological:      Mental Status: He is alert.   Psychiatric:         Mood and Affect: Mood normal.         Behavior: Behavior normal.         Thought Content: Thought content normal.         Judgment: Judgment normal.       Lab on 06/14/2024   Component Date Value Ref Range Status    Amphetamine Screen, Urine 06/14/2024 Presumptive Negative  Presumptive Negative Final    CUTOFF LEVEL: 500 NG/ML   Cross-reactivity has been reported with high concentrations   of the following drugs: buproprion, chloroquine, chlorpromazine,   ephedrine,  mephentermine, fenfluramine, phentermine,   phenylpropanolamine, pseudoephedrine, and propranolol.    Barbiturate Screen, Urine 06/14/2024 Presumptive Negative  Presumptive Negative Final    CUTOFF LEVEL: 200 NG/ML    Benzodiazepines Screen, Urine 06/14/2024 Presumptive Positive (A)  Presumptive Negative Final    CUTOFF LEVEL: 200 NG/ML    Cannabinoid Screen, Urine 06/14/2024 Presumptive Negative  Presumptive Negative Final    CUTOFF LEVEL: 50 NG/ML    Cocaine Metabolite Screen, Urine 06/14/2024 Presumptive Negative  Presumptive Negative Final    CUTOFF LEVEL: 150 NG/ML    Fentanyl Screen, Urine 06/14/2024 Presumptive Negative  Presumptive Negative Final    CUTOFF LEVEL: 5 NG/ML    Opiate Screen, Urine 06/14/2024 Presumptive Negative  Presumptive Negative Final    CUTOFF LEVEL: 300 NG/ML  The opiate screen does not detect fentanyl, meperidine, or   tramadol. Oxycodone is not consistently detected (refer to  Oxycodone Screen, Urine result).    Oxycodone Screen, Urine 06/14/2024 Presumptive Negative  Presumptive Negative Final    CUTOFF LEVEL: 100 NG/ML  This test will accurately detect both oxycodone and oxymorphone.    PCP Screen, Urine 06/14/2024 Presumptive Negative  Presumptive Negative Final    CUTOFF LEVEL:  25 NG/ML  Cross-reactivity has been reported with dextromethorphan.    Methadone Screen, Urine 06/14/2024 Presumptive Negative  Presumptive Negative Final    CUTOFF LEVEL: 150 NG/ML  The metabolite L-alpha-acetylmethadol (LAAM) is not  detected by this method in concentrations that would  be found in the urine of patients on LAAM therapy.    Clonazepam 06/14/2024 <25  <25 ng/mL Final    7-Aminoclonazepam 06/14/2024 <25  <25 ng/mL Final    Alprazolam 06/14/2024 51 (H)  <25 ng/mL Final    Consistent with use of a drug containing alprazolam, such as Xanax.      Alpha-Hydroxyalprazolam 06/14/2024 68 (H)  <25 ng/mL Final    Alprazolam metabolite; consistent with use of a drug containing alprazolam, such as  Xanax.    Midazolam 06/14/2024 <25  <25 ng/mL Final    Alpha-Hydroxymidazolam 06/14/2024 <25  <25 ng/mL Final    Chlordiazepoxide 06/14/2024 <25  <25 ng/mL Final    Diazepam 06/14/2024 <25  <25 ng/mL Final    Nordiazepam 06/14/2024 <25  <25 ng/mL Final    Temazepam 06/14/2024 <25  <25 ng/mL Final    Oxazepam 06/14/2024 <25  <25 ng/mL Final    Lorazepam 06/14/2024 <25  <25 ng/mL Final     Current Outpatient Medications on File Prior to Visit   Medication Sig Dispense Refill    ALPRAZolam (Niravam) 0.25 mg disintegrating tablet Dissolve 1 tablet (0.25 mg) in the mouth 3 times a day as needed for anxiety. Do not fill before May 20, 2025. 60 tablet 0    Bacillus coagulans-inulin 1 billion-250 cell-mg capsule Take 1 capsule by mouth once daily.      ezetimibe (Zetia) 10 mg tablet Take 1 tablet (10 mg) by mouth once daily.      losartan (Cozaar) 25 mg tablet Take 4 tablets (100 mg) by mouth once daily.      meclizine (Antivert) 12.5 mg tablet Take 2 tablets (25 mg) by mouth 3 times a day as needed for dizziness. 30 tablet 11    metoprolol succinate XL (Toprol-XL) 100 mg 24 hr tablet Take 1 tablet (100 mg) by mouth once daily. 30 tablet 11    mirtazapine (Remeron) 7.5 mg tablet TAKE ONE TABLET BY MOUTH EVERY DAY AT BEDTIME 30 tablet 0    multivitamin tablet Take 1 tablet by mouth once daily.      vit A/vit C/vit E/zinc/copper (PRESERVISION AREDS ORAL) PreserVision      zinc gluconate 50 mg tablet Take 1 tablet by mouth once daily.      [DISCONTINUED] ALPRAZolam (Niravam) 0.25 mg disintegrating tablet Dissolve 1 tablet (0.25 mg) in the mouth 3 times a day as needed for anxiety. 60 tablet 0    [DISCONTINUED] ALPRAZolam (Niravam) 0.25 mg disintegrating tablet Dissolve 1 tablet (0.25 mg) in the mouth 3 times a day as needed for anxiety. Do not fill before April 20, 2025. 60 tablet 0     No current facility-administered medications on file prior to visit.     No images are attached to the encounter.      OARRS:  Angie Sorto,  DO on 6/11/2025 11:22 AM  I have personally reviewed the OARRS report for Avtar Gonsales. I have considered the risks of abuse, dependence, addiction and diversion    Is the patient prescribed a combination of a benzodiazepine and opioid?  No    Last Urine Drug Screen / ordered today: No  Recent Results (from the past 8760 hours)   Benzodiazepine Confirmation, Urine    Collection Time: 06/14/24 11:58 AM   Result Value Ref Range    Clonazepam <25 <25 ng/mL    7-Aminoclonazepam <25 <25 ng/mL    Alprazolam 51 (H) <25 ng/mL    Alpha-Hydroxyalprazolam 68 (H) <25 ng/mL    Midazolam <25 <25 ng/mL    Alpha-Hydroxymidazolam <25 <25 ng/mL    Chlordiazepoxide <25 <25 ng/mL    Diazepam <25 <25 ng/mL    Nordiazepam <25 <25 ng/mL    Temazepam <25 <25 ng/mL    Oxazepam <25 <25 ng/mL    Lorazepam <25 <25 ng/mL   Drug Screen, Urine With Reflex to Confirmation    Collection Time: 06/14/24 11:58 AM   Result Value Ref Range    Amphetamine Screen, Urine Presumptive Negative Presumptive Negative    Barbiturate Screen, Urine Presumptive Negative Presumptive Negative    Benzodiazepines Screen, Urine Presumptive Positive (A) Presumptive Negative    Cannabinoid Screen, Urine Presumptive Negative Presumptive Negative    Cocaine Metabolite Screen, Urine Presumptive Negative Presumptive Negative    Fentanyl Screen, Urine Presumptive Negative Presumptive Negative    Opiate Screen, Urine Presumptive Negative Presumptive Negative    Oxycodone Screen, Urine Presumptive Negative Presumptive Negative    PCP Screen, Urine Presumptive Negative Presumptive Negative    Methadone Screen, Urine Presumptive Negative Presumptive Negative       Results are as expected.     Controlled Substance Agreement:  Date of the Last Agreement: 03/04/2024  Reviewed Controlled Substance Agreement including but not limited to the benefits, risks, and alternatives to treatment with a Controlled Substance medication(s).    Benzodiazepines:  What is the patient's goal  of therapy? Less anxiety  Is this being achieved with current treatment? yes    MAXIM-7:  No data recorded          Activities of Daily Living:   Is your overall impression that this patient is benefiting (symptom reduction outweighs side effects) from benzodiazepine therapy? Yes     1. Physical Functioning: Same  2. Family Relationship: Same  3. Social Relationship: Same  4. Mood: Same  5. Sleep Patterns: Same  6. Overall Function: Same      Assessment/Plan   Problem List Items Addressed This Visit       Anxiety - Primary         1. Patient to come in one month to discuss possible switch from lorazepam to ativan  2.  Patient has signed his benzodiazepine contract 03/04/2024  3.  Patient's urine drug screen performed 06/11/2025  4.  Patient to call if questions or concerns

## 2025-06-12 ENCOUNTER — APPOINTMENT (OUTPATIENT)
Dept: RADIOLOGY | Facility: CLINIC | Age: 85
End: 2025-06-12
Payer: MEDICARE

## 2025-06-12 ENCOUNTER — HOSPITAL ENCOUNTER (OUTPATIENT)
Dept: RADIOLOGY | Facility: CLINIC | Age: 85
End: 2025-06-12
Payer: MEDICARE

## 2025-06-20 ENCOUNTER — APPOINTMENT (OUTPATIENT)
Dept: PRIMARY CARE | Facility: CLINIC | Age: 85
End: 2025-06-20
Payer: MEDICARE

## 2025-06-23 DIAGNOSIS — G47.00 INSOMNIA, UNSPECIFIED TYPE: ICD-10-CM

## 2025-06-23 RX ORDER — MIRTAZAPINE 7.5 MG/1
7.5 TABLET, FILM COATED ORAL NIGHTLY
Qty: 30 TABLET | Refills: 1 | Status: SHIPPED | OUTPATIENT
Start: 2025-06-23

## 2025-07-21 ENCOUNTER — TELEPHONE (OUTPATIENT)
Dept: PRIMARY CARE | Facility: CLINIC | Age: 85
End: 2025-07-21
Payer: MEDICARE

## 2025-07-22 DIAGNOSIS — R42 VERTIGO: ICD-10-CM

## 2025-07-28 DIAGNOSIS — M25.561 CHRONIC PAIN OF RIGHT KNEE: ICD-10-CM

## 2025-07-28 DIAGNOSIS — G89.29 CHRONIC PAIN OF RIGHT KNEE: ICD-10-CM

## 2025-09-10 ENCOUNTER — APPOINTMENT (OUTPATIENT)
Dept: PRIMARY CARE | Facility: CLINIC | Age: 85
End: 2025-09-10
Payer: MEDICARE

## 2025-11-26 ENCOUNTER — APPOINTMENT (OUTPATIENT)
Dept: PRIMARY CARE | Facility: CLINIC | Age: 85
End: 2025-11-26
Payer: MEDICARE